# Patient Record
Sex: FEMALE | Race: WHITE | NOT HISPANIC OR LATINO | Employment: PART TIME | URBAN - METROPOLITAN AREA
[De-identification: names, ages, dates, MRNs, and addresses within clinical notes are randomized per-mention and may not be internally consistent; named-entity substitution may affect disease eponyms.]

---

## 2018-08-21 ENCOUNTER — TELEPHONE (OUTPATIENT)
Dept: OTHER | Facility: CLINIC | Age: 33
End: 2018-08-21

## 2018-08-21 NOTE — TELEPHONE ENCOUNTER
8/21/2018    Call Regarding Onboarding [employer] [facility]     Attempt 1    Message on voicemail    Comments: 0 dep      Outreach   Kitty Freitas

## 2018-09-21 NOTE — TELEPHONE ENCOUNTER
9/21/2018    Call Regarding Onboarding [employer] [facility]    Attempt 2    Message on voicemail     Comments: 0 DEP      Outreach   CC

## 2018-09-27 NOTE — TELEPHONE ENCOUNTER
9/27/2018    Call Regarding Onboarding: [facility]    Attempt 3    Message on voicemail     Comments:       Outreach   SV

## 2018-10-26 ENCOUNTER — OFFICE VISIT - HEALTHEAST (OUTPATIENT)
Dept: FAMILY MEDICINE | Facility: CLINIC | Age: 33
End: 2018-10-26

## 2018-10-26 ENCOUNTER — COMMUNICATION - HEALTHEAST (OUTPATIENT)
Dept: TELEHEALTH | Facility: CLINIC | Age: 33
End: 2018-10-26

## 2018-10-26 ENCOUNTER — COMMUNICATION - HEALTHEAST (OUTPATIENT)
Dept: FAMILY MEDICINE | Facility: CLINIC | Age: 33
End: 2018-10-26

## 2018-10-26 DIAGNOSIS — Z00.00 ROUTINE HEALTH MAINTENANCE: ICD-10-CM

## 2018-10-26 DIAGNOSIS — Z13.1 SCREENING FOR DIABETES MELLITUS: ICD-10-CM

## 2018-10-26 DIAGNOSIS — Z13.220 SCREENING FOR LIPID DISORDERS: ICD-10-CM

## 2018-10-26 LAB
ANION GAP SERPL CALCULATED.3IONS-SCNC: 10 MMOL/L (ref 5–18)
BUN SERPL-MCNC: 10 MG/DL (ref 8–22)
CALCIUM SERPL-MCNC: 9.3 MG/DL (ref 8.5–10.5)
CHLORIDE BLD-SCNC: 106 MMOL/L (ref 98–107)
CHOLEST SERPL-MCNC: 136 MG/DL
CO2 SERPL-SCNC: 24 MMOL/L (ref 22–31)
CREAT SERPL-MCNC: 0.74 MG/DL (ref 0.6–1.1)
FASTING STATUS PATIENT QL REPORTED: YES
GFR SERPL CREATININE-BSD FRML MDRD: >60 ML/MIN/1.73M2
GLUCOSE BLD-MCNC: 85 MG/DL (ref 70–125)
HDLC SERPL-MCNC: 61 MG/DL
LDLC SERPL CALC-MCNC: 58 MG/DL
POTASSIUM BLD-SCNC: 4.5 MMOL/L (ref 3.5–5)
SODIUM SERPL-SCNC: 140 MMOL/L (ref 136–145)
TRIGL SERPL-MCNC: 85 MG/DL

## 2018-10-26 RX ORDER — ETONOGESTREL/ETHINYL ESTRADIOL .12-.015MG
RING, VAGINAL VAGINAL
Refills: 2 | Status: ON HOLD | COMMUNITY
Start: 2018-10-13 | End: 2022-01-24

## 2018-10-26 RX ORDER — CETIRIZINE HYDROCHLORIDE 10 MG/1
TABLET ORAL
Status: ON HOLD | COMMUNITY
Start: 2018-10-26 | End: 2022-01-28

## 2018-10-26 ASSESSMENT — MIFFLIN-ST. JEOR: SCORE: 1490.93

## 2018-12-06 ENCOUNTER — OFFICE VISIT - HEALTHEAST (OUTPATIENT)
Dept: FAMILY MEDICINE | Facility: CLINIC | Age: 33
End: 2018-12-06

## 2018-12-06 DIAGNOSIS — J34.2 DEVIATED NASAL SEPTUM: ICD-10-CM

## 2018-12-06 DIAGNOSIS — Z01.818 ENCOUNTER FOR PREOPERATIVE EXAMINATION FOR GENERAL SURGICAL PROCEDURE: ICD-10-CM

## 2018-12-27 ENCOUNTER — SURGERY - HEALTHEAST (OUTPATIENT)
Dept: SURGERY | Facility: CLINIC | Age: 33
End: 2018-12-27

## 2018-12-28 ENCOUNTER — SURGERY - HEALTHEAST (OUTPATIENT)
Dept: SURGERY | Facility: CLINIC | Age: 33
End: 2018-12-28

## 2018-12-28 ENCOUNTER — ANESTHESIA - HEALTHEAST (OUTPATIENT)
Dept: SURGERY | Facility: CLINIC | Age: 33
End: 2018-12-28

## 2018-12-28 ASSESSMENT — MIFFLIN-ST. JEOR: SCORE: 1456.11

## 2021-06-02 VITALS — BODY MASS INDEX: 24.67 KG/M2 | WEIGHT: 162.8 LBS | HEIGHT: 68 IN

## 2021-06-02 VITALS — WEIGHT: 156 LBS | BODY MASS INDEX: 23.64 KG/M2 | HEIGHT: 68 IN

## 2021-06-02 VITALS — BODY MASS INDEX: 25.09 KG/M2 | WEIGHT: 166.2 LBS

## 2021-06-21 NOTE — PROGRESS NOTES
FEMALE PREVENTATIVE EXAM    Assessment and Plan:       1. Routine health maintenance  Healthy female exam    2. Screening for diabetes mellitus  - Basic Metabolic Panel    3. Screening for lipid disorders  - Lipid Hondo FASTING     Next follow up:  Return in about 1 year (around 10/26/2019).    Immunization Review  Adult Imm Review: No immunizations due today    I discussed the following with the patient:   Adult Healthy Living: Importance of regular exercise  Healthy nutrition  Stress management    I have had an Advance Directives discussion with the patient.    Subjective:   Chief Complaint: Suzette Rivera is an 33 y.o. female here for a preventative health visit.     HPI: Patient is .  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    Patient follows with dermatology, ENT, and gynecology.  She is scheduled to see her gynecologist next week for routine pelvic exam.  Patient is currently using a NuvaRing for contraception.  Periods are regular.    Patient recently seen at [facility] ENT due to chronic sinus congestion.  Patient was told she has a deviated septum, and is scheduled for a CT of the sinuses.  She also recently went through with a sleep study.  She does use a oral device at night.    History of exercise-induced asthma as a child.  This has mostly resolved.    Patient does not have any other complaints/concerns today.    Healthy Habits  Are you taking a daily aspirin? No  Do you typically exercising at least 40 min, 3-4 times per week?  NO  Do you usually eat at least 4 servings of fruit and vegetables a day, include whole grains and fiber and avoid regularly eating high fat foods? Yes  Have you had an eye exam in the past two years? Yes  Do you see a dentist twice per year? Yes  Do you have any concerns regarding sleep? No    Safety Screen  If you own firearms, are they secured in a locked gun cabinet or with trigger  "locks? The patient does not own any firearms  Do you feel you are safe where you are living?: Yes (10/26/2018  7:47 AM)  Do you feel you are safe in your relationship(s)?: Yes (10/26/2018  7:47 AM)    Review of Systems:  Please see above.  The rest of the review of systems are negative for all systems.     Pap History:   UTD.  Follow with Lamine YEUNG Scheduled next week  Cancer Screening       Status Date      PAP SMEAR Overdue 7/23/2016           Patient Care Team:  Tomeka Palm NP as PCP - General (Family Medicine)        History     Reviewed By Date/Time Sections Reviewed    Tomeka Palm NP 10/26/2018  7:54 AM Tobacco            Objective:   Vital Signs:   Visit Vitals     BP 98/60 (Patient Site: Right Arm, Patient Position: Sitting, Cuff Size: Adult Regular)     Pulse 88     Temp 98.1  F (36.7  C) (Oral)     Ht 5' 8.25\" (1.734 m)     Wt 162 lb 12.8 oz (73.8 kg)     Breastfeeding No     BMI 24.57 kg/m2          PHYSICAL EXAM  General Appearance: Alert, cooperative, no distress, appears stated age  Head: Normocephalic, without obvious abnormality, atraumatic  Eyes: PERRL, conjunctiva/corneas clear, EOM's intact  Ears: Normal TM's and external ear canals, both ears  Nose: Nares normal, septum deviated,mucosa normal, no drainage  Throat: Lips, mucosa, and tongue normal; teeth and gums normal  Neck: Supple, symmetrical, trachea midline, no adenopathy;  thyroid: not enlarged, symmetric, no tenderness/mass/nodules  Back: no CVA tenderness  Lungs: Clear to auscultation bilaterally, respirations unlabored  Breasts: No breast masses, tenderness, asymmetry, or nipple discharge.  Heart: Regular rate and rhythm, S1 and S2 normal, no murmur, rub, or gallop  Abdomen: Soft, non-tender, bowel sounds active all four quadrants,  no masses, no organomegaly  Extremities: Extremities normal, atraumatic, no cyanosis or edema  Skin: Skin color, texture, turgor normal, no rashes or lesions  Lymph nodes: Cervical, " supraclavicular, and axillary nodes normal  Neurologic: Normal   Psychologic: appropriate affective, answers all of my questions appropriately. No hallucinations, delusion, or suicidal ideations.    CHECO GormanC

## 2021-06-22 NOTE — PROGRESS NOTES
"Preoperative Exam    Scheduled Procedure: Deviated septum repair  Surgery Date:  12/28/18  Surgery Location: [facility] FAX:  [phone]  Surgeon:  Dr. Martinez    Assessment/Plan:     1. Encounter for preoperative examination for general surgical procedure  Cleared for surgery.  I do recommend a urine pregnancy test the day of surgery.    2. Deviated nasal septum    Surgical Procedure Risk: Low (reported cardiac risk generally < 1%)  Have you had prior anesthesia?: Yes  Have you or any family members had a previous anesthesia reaction:  Yes: mother is slow to coming out of it.  Do you or any family members have a history of a clotting or bleeding disorder?: No  Cardiac Risk Assessment: no increased risk for major cardiac complications    Patient approved for surgery with general or local anesthesia.    Please Note:  None    Functional Status: Independent  Patient plans to recover at home with family.     Subjective:      Suzette Rivera is a 33 y.o. female who presents for a preoperative consultation.  Patient is scheduled for a nasal septum repair due to deviation.  Reports difficulty with chronic congestion and post nasal drip.  She is a \"mouth-breather\" at night, as she has difficulty breathing from her nose.  She has tried oral antihistamines and nasal steroids without significant improvement.    Patient is having regular menstrual cycles.  Using the Nuva Ring for contraception.      All other systems reviewed and are negative, other than those listed in the HPI.    Pertinent History  Do you have difficulty breathing or chest pain after walking up a flight of stairs: No  History of obstructive sleep apnea: No  Steroid use in the last 6 months: No  Frequent Aspirin/NSAID use: No  Prior Blood Transfusion: No  Prior Blood Transfusion Reaction: N/A  If for some reason prior to, during or after the procedure, if it is medically indicated, would you be willing to have a blood transfusion?:  There is " no transfusion refusal.    Current Outpatient Medications   Medication Sig Dispense Refill     cetirizine (ZYRTEC) 10 MG tablet Take by mouth.       NUVARING 0.12-0.015 mg/24 hr vaginal ring   2     No current facility-administered medications for this visit.         Allergies   Allergen Reactions     Adhesive Tape-Silicones Other (See Comments)     Steri Strips     Augmentin [Amoxicillin-Pot Clavulanate] Rash       There is no problem list on file for this patient.      History reviewed. No pertinent past medical history.    Past Surgical History:   Procedure Laterality Date     HALLUX VALGUS CORRECTION Right      REFRACTIVE SURGERY         Social History     Socioeconomic History     Marital status:      Spouse name: Not on file     Number of children: Not on file     Years of education: Not on file     Highest education level: Not on file   Social Needs     Financial resource strain: Not on file     Food insecurity - worry: Not on file     Food insecurity - inability: Not on file     Transportation needs - medical: Not on file     Transportation needs - non-medical: Not on file   Occupational History     Not on file   Tobacco Use     Smoking status: Never Smoker     Smokeless tobacco: Never Used   Substance and Sexual Activity     Alcohol use: Yes     Drug use: No     Sexual activity: Yes     Partners: Male     Birth control/protection: Inserts   Other Topics Concern     Not on file   Social History Narrative     Not on file       Patient Care Team:  Tomeka Palm NP as PCP - General (Family Medicine)          Objective:     Vitals:    12/06/18 0859   BP: 98/70   Pulse: 100   Temp: 97.7  F (36.5  C)   TempSrc: Oral   Weight: 166 lb 3.2 oz (75.4 kg)   LMP: 11/14/2018         Physical Exam:  General Appearance: Alert, cooperative, no distress, appears stated age  Eyes: PERRL, conjunctiva/corneas clear, EOM's intact  Ears: Normal TM's and external ear canals, both ears  Nose: Nares normal, septum  deviated  Throat: Lips, mucosa, and tongue normal; teeth and gums normal  Neck: Supple, symmetrical, trachea midline, no adenopathy;  thyroid: not enlarged, symmetric, no tenderness/mass/nodules; no carotid bruit or JVD  Lungs: Clear to auscultation bilaterally, respirations unlabored  Heart: Regular rate and rhythm, S1 and S2 normal, no murmur, rub, or gallop  Abdomen: Soft, non-tender, bowel sounds active all four quadrants,  no masses, no organomegaly  Extremities: Extremities normal, atraumatic, no cyanosis or edema  Skin: Skin color, texture, turgor normal, no rashes or lesions  Lymph nodes: Cervical, supraclavicular nodes normal  Neurologic: Normal   Psychologic: appropriate affective, answers all of my questions appropriately. No hallucinations, delusion, or suicidal ideations.      Patient Instructions     Hold all supplements, aspirin and NSAIDs for 7 days prior to surgery.  Follow your surgeon's direction on when to stop eating and drinking prior to surgery.  Your surgeon will be managing your pain after your surgery.    Remove all jewelry and metal piercings before your surgery.   Remove nail polish from fingers before surgery.      Labs:  No labs were ordered during this visit    Immunization History   Administered Date(s) Administered     DTP 1986, 1986, 01/26/1987, 07/25/1988, 04/08/1991     Dtap 1986, 1986, 01/26/1987, 07/25/1988, 04/08/1991     Hep A, Adult IM (19yr & older) 10/18/2010, 02/05/2013     Hep B, Peds or Adolescent 08/05/1996, 09/23/1996, 06/05/1997     HiB, historic,unspecified 06/20/1988     Hib (PRP-T) 06/20/1988     Influenza S7m2-43, 11/13/2009     Influenza, Live, Nasal LAIV3 10/18/2010     Influenza,seasonal quad, PF, 36+MOS 11/19/2016     MMR 06/26/1987, 10/26/1987, 08/26/1998     Meningococcal MPSV4, SQ 07/14/2004     OPV,Trivalent,Historic(5924-3938 only) 1986, 1986, 07/25/1988, 04/08/1991     POLIO, Unspecified 1986, 1986,  07/25/1988, 04/08/1991     Td, Adult, Absorbed 08/26/1998     Tdap 11/06/2008     Typhoid, Inj, Inactive 10/18/2010     Varicella 06/16/2008           Electronically signed by Tomeka Palm NP 12/06/18 8:55 AM

## 2021-06-22 NOTE — ANESTHESIA CARE TRANSFER NOTE
Last vitals:   Vitals:    12/28/18 1558   BP: 141/84   Pulse: 80   Resp: 14   Temp: 36.3  C (97.4  F)   SpO2: 100%     Patient's level of consciousness is awake and drowsy  Spontaneous respirations: yes  Maintains airway independently: yes  Dentition unchanged: yes  Oropharynx: oropharynx clear of all foreign objects    QCDR Measures:  ASA# 20 - Surgical Safety Checklist: WHO surgical safety checklist completed prior to induction    PQRS# 430 - Adult PONV Prevention: 4558F - Pt received => 2 anti-emetic agents (different classes) preop & intraop  ASA# 8 - Peds PONV Prevention: NA - Not pediatric patient, not GA or 2 or more risk factors NOT present  PQRS# 424 - Chasity-op Temp Management: 4559F - At least one body temp DOCUMENTED => 35.5C or 95.9F within required timeframe  PQRS# 426 - PACU Transfer Protocol: - Transfer of care checklist used  ASA# 14 - Acute Post-op Pain: ASA14B - Patient did NOT experience pain >= 7 out of 10

## 2021-06-22 NOTE — ANESTHESIA PREPROCEDURE EVALUATION
Anesthesia Evaluation      Patient summary reviewed   No history of anesthetic complications     Airway   Mallampati: III  Neck ROM: full   Pulmonary - normal exam    breath sounds clear to auscultation                         Cardiovascular - normal exam  Exercise tolerance: > or = 4 METS  ECG reviewed  Rhythm: regular  Rate: normal,         Neuro/Psych - negative ROS     Endo/Other - negative ROS      GI/Hepatic/Renal - negative ROS           Dental    (+) poor dentition and chipped                       Anesthesia Plan  Planned anesthetic: general endotracheal  -- RSI with Succ  -- PONV prophylaxis with Decadron 10 mg and Zofran 4 mg  -- toradol if ok with surgeon    ASA 1   Induction: intravenous   Anesthetic plan and risks discussed with: patient  Anesthesia plan special considerations: antiemetics,   Post-op plan: routine recovery

## 2021-06-22 NOTE — ANESTHESIA POSTPROCEDURE EVALUATION
Patient: Suzette Rivera  SEPTOPLASTY, SUBMUCOSAL RESECTION OF TURBINATES, CAUTERY OF TURBINATES, NIMA BULLOSA RESECTION, PARTIAL ETHMOIDECTOMY, BILATERAL ANTROSTOMY WITH CURETTAGE, ADENOIDECTOMY  Anesthesia type: general    Patient location: PACU  Last vitals:   Vitals:    12/28/18 1645   BP: 131/68   Pulse: 78   Resp: 22   Temp:    SpO2: 98%     Post vital signs: stable  Level of consciousness: awake and responds to simple questions  Post-anesthesia pain: pain controlled  Post-anesthesia nausea and vomiting: no  Pulmonary: unassisted  Cardiovascular: stable  Hydration: adequate  Anesthetic events: no    QCDR Measures:  ASA# 11 - Chasity-op Cardiac Arrest: ASA11B - Patient did NOT experience unanticipated cardiac arrest  ASA# 12 - Chasity-op Mortality Rate: ASA12B - Patient did NOT die  ASA# 13 - PACU Re-Intubation Rate: ASA13B - Patient did NOT require a new airway mgmt  ASA# 10 - Composite Anes Safety: ASA10A - No serious adverse event    Additional Notes:

## 2021-07-22 LAB
ABO (EXTERNAL): NORMAL
HEMOGLOBIN (EXTERNAL): 11.3 G/DL (ref 11.7–15.5)
HEPATITIS B SURFACE ANTIGEN (EXTERNAL): NONREACTIVE
HIV1+2 AB SERPL QL IA: NONREACTIVE
PLATELET COUNT (EXTERNAL): 250 10E3/UL (ref 140–400)
RH (EXTERNAL): POSITIVE
RUBELLA ANTIBODY IGG (EXTERNAL): NORMAL
TREPONEMA PALLIDUM ANTIBODY (EXTERNAL): NONREACTIVE

## 2021-07-27 ENCOUNTER — MEDICAL CORRESPONDENCE (OUTPATIENT)
Dept: HEALTH INFORMATION MANAGEMENT | Facility: CLINIC | Age: 36
End: 2021-07-27

## 2021-07-27 ENCOUNTER — TRANSCRIBE ORDERS (OUTPATIENT)
Dept: MATERNAL FETAL MEDICINE | Facility: HOSPITAL | Age: 36
End: 2021-07-27

## 2021-07-27 ENCOUNTER — TRANSFERRED RECORDS (OUTPATIENT)
Dept: HEALTH INFORMATION MANAGEMENT | Facility: CLINIC | Age: 36
End: 2021-07-27

## 2021-07-27 DIAGNOSIS — O26.90 PREGNANCY RELATED CONDITION, ANTEPARTUM: Primary | ICD-10-CM

## 2021-09-13 ENCOUNTER — PRE VISIT (OUTPATIENT)
Dept: MATERNAL FETAL MEDICINE | Facility: HOSPITAL | Age: 36
End: 2021-09-13

## 2021-09-17 ENCOUNTER — OFFICE VISIT (OUTPATIENT)
Dept: MATERNAL FETAL MEDICINE | Facility: HOSPITAL | Age: 36
End: 2021-09-17
Attending: ADVANCED PRACTICE MIDWIFE
Payer: COMMERCIAL

## 2021-09-17 ENCOUNTER — ANCILLARY PROCEDURE (OUTPATIENT)
Dept: ULTRASOUND IMAGING | Facility: HOSPITAL | Age: 36
End: 2021-09-17
Attending: ADVANCED PRACTICE MIDWIFE
Payer: COMMERCIAL

## 2021-09-17 DIAGNOSIS — O09.812 PREGNANCY RESULTING FROM IN VITRO FERTILIZATION, SECOND TRIMESTER: ICD-10-CM

## 2021-09-17 DIAGNOSIS — O44.02 PLACENTA PREVIA ANTEPARTUM IN SECOND TRIMESTER: ICD-10-CM

## 2021-09-17 DIAGNOSIS — O09.512 AMA (ADVANCED MATERNAL AGE) PRIMIGRAVIDA 35+, SECOND TRIMESTER: Primary | ICD-10-CM

## 2021-09-17 DIAGNOSIS — Z36.9 PRENATAL SCREENING ENCOUNTER: ICD-10-CM

## 2021-09-17 DIAGNOSIS — O09.812 PREGNANCY RESULTING FROM IN VITRO FERTILIZATION IN SECOND TRIMESTER: ICD-10-CM

## 2021-09-17 DIAGNOSIS — O26.90 PREGNANCY RELATED CONDITION, ANTEPARTUM: ICD-10-CM

## 2021-09-17 PROCEDURE — 99207 PR NO CHARGE LOS: CPT | Performed by: OBSTETRICS & GYNECOLOGY

## 2021-09-17 PROCEDURE — 76811 OB US DETAILED SNGL FETUS: CPT

## 2021-09-17 PROCEDURE — 96040 HC GENETIC COUNSELING, EACH 30 MINUTES: CPT | Performed by: GENETIC COUNSELOR, MS

## 2021-09-17 NOTE — PROGRESS NOTES
[facility]  Genetic Counseling Consult    Patient:  Suzette Rivera YOB: 1985   Date of Service:  21      Suzette Rivera was seen at the [facility] for genetic consultation as part of her appointment for comprehensive ultrasound.  The indication for genetic counseling is advanced maternal age at the time of delivery and IVF pregnancy. Suzette was accompanied to today's consult by her , Mike.        Impression/Plan:   1. Suzette had a cell-free fetal DNA test earlier in pregnancy, which was normal. The couple are aware of the predicted fetal sex (female). Suzette and Mike have also undergone expanded carrier screening, they report today that they were not identified to be a carrier couple for any autosomal recessive conditions. Copies of their results were not available for review during today's consult.     2. Suzette had a comprehensive (level II) ultrasound today.  Please see the ultrasound report for further details.    3. The patient declines genetic amniocentesis and additional maternal serum screening (including MS-AFP) today.    Pregnancy History:   /Parity:     Age at Delivery: 36 year old  ANYI: 2022, by embryo transfer date of 21 Gestational Age: 18w4d    No significant complications or exposures were reported in the current pregnancy.    Suzette Rivera's current pregnancy was conceived via fresh day 5 embryo transfer on 21 using her egg and her 's sperm. Suzette Rivera was 35 years old when her egg was retrieved. The embryo did not undergo preimplantation genetic screening for aneuploidy prior to transfer. The couple have five remaining embryos that are frozen (day 6). No genetic testing has been completed on them as of today's consult. Suzette Rivera is aware of the recommendation for a fetal echocardiogram due to an IVF conception. We reviewed that the average pregnancy has a  0.5-1.0% chance of a congenital heart defect. However, studies suggest an increased chance for a heart defect for IVF pregnancies, with estimates ranging from 1-3.3%. Fetal echocardiograms are typically performed at 20 to 24 weeks gestation.     o Additionally, the current pregnancy was believed to be a monochorionic/ diamniotic twin gestation on week 6 ultrasound. However, when scanned at week 8, it was noted that twin 1 was not measuring much past week 6 while twin 2 continued to grow and was the appropriate size for dates. Therefore, the current eason pregnancy has resulted from a vanishing twin pregnancy.    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 Suzette reports he has elevated FSH levels and underwent chromosome analysis and Y-chromosome microdeletion analysis. Both genetic tests returned unremarkable, suggesting Mike does not have any notable chromosome anomalies or detectable copy number variants on his Y chromosome. Mike underwent sperm extraction for the purposes of IVF in 2021.    Medical History:   Suzette s reported medical history is not expected to impact pregnancy management or risks to fetal development.       Family History:   A three-generation pedigree was obtained, and is scanned under the  Media  tab.     The following significant findings were reported by Suzette:    Mother: kidney cancer diagnosed at age 26. She has had negative cancer testing through a 47 gene panel that is reported to have included both kidney cancer and breast cancer genes.    Maternal aunt: diagnosed in her 40s with breast cancer.     Maternal uncle: isolated bilateral club feet    Maternal grandmother: history of one possible stillbirth vs miscarriage, the cause of which is unknown to Suzette    Maternal grandmother's mother: history of breast cancer that then is believed to have recurred as ovarian cancer.     Maternal grandfather:  at age 53 from bladder cancer, he had  "a history of alcohol abuse.     Maternal grandfather's mother: believed to have a history of primary metastatic breast cancer.     Paternal uncle:  at age 11 due to unknown causes.     Paternal uncle: diagnosed with non-hodgkins lymphoma at age 18    Paternal grandfather:  in his 60s from a brain tumor, believed to be caused by radiation exposure while he was in the .     Mike: he is reported to have been diagnosed with epilepsy at age 22. While playing basketball, he began to experience aura and a sensation of terrence vu, later in his neurology work-up he experienced a grand mal seizure. He reports that every two weeks, he has a span of 2-3 days where, during multiple points in the day, he has \"small\" seizures. He experiences a grand mal seizure approximately once every 4-5 years. No other family history of seizures is known to the couple. He also had a history of skin cancer on lip lip, s/p surgical resection.   o We reviewed that Mike's seizure history increases the risk for any child of his to present similarly to him, however it is not a guarantee. Should a genetic etiology be identified, recurrence risks to the current pregnancy would likely be elevated over the general population risk. However, in the case of a sporadic/environmental, non-genetic cause of his seizures, recurrence risks are likely back at the general population risk. No prenatal testing for seizures or epilepsy in the current pregnancy is available. The couple verbalized understanding.    Mike's brother: personal history of severe melanoma that required intensive treatment. He is doing well overall today.     Mike's paternal uncle:  from lung cancer, believed to be related to exposures in the [place] war.     Mike's maternal uncle: possible history of male-factor infertility, believed to be related to a viral illness he had as a child. The details of this history are not well known to the family. He has no biological children " of his own.     Otherwise, the reported family history is negative for multiple miscarriages, stillbirths, birth defects, intellectual disabililties, known genetic conditions, and consanguinity.       Carrier Screening:   The patient reports that she and the father of the pregnancy have  ancestry:     Cystic fibrosis is an autosomal recessive genetic condition that occurs with increased frequency in individuals of  ancestry and carrier screening for this condition is available.  In addition,  screening in the [facility] includes cystic fibrosis.      Expanded carrier screening for mutations in a large panel of genes associated with autosomal recessive conditions including cystic fibrosis, spinal muscular atrophy, and others, is now available.      The couple have undergone expanded carrier screening as part of their IVF work-up and today report they are not a carrier couple for any given autosomal recessive condition. Copies of their carrier screening results were not available for review today.        Risk Assessment for Chromosome Conditions:     The risk for fetal chromosome abnormalities increases with maternal age. We discussed specific features of common chromosome abnormalities, including Down syndrome, trisomy 13, trisomy 18, and sex chromosome trisomies. Because the patient underwent egg retrieval at age 35, all embryos had the following risks to present wtih chromosome anomalies:     At age 35 at delivery, the risk to have a baby with Down syndrome is 1 in 500.   At age 35 at delivery, the risk to have a baby with any chromosome abnormality is 1 in 253.       Suzette had maternal serum screening earlier in pregnancy.     Non-invasive Prenatal Testing (NIPT)    Maternal plasma cell-free DNA testing    Screens for fetal trisomy 21, trisomy 13, trisomy 18, and sex chromosome aneuploidy    First trimester ultrasound with nuchal translucency and nasal bone assessment was within normal  limits.    Suzette had a Liz Panorama test earlier in pregnancy; we reviewed the results today, which are normal for chromosome 13, chromosome 18, chromosome 21, and the sex chromosomes (no aneuploidy detected)    Given the accuracy of this test, these results greatly decrease the chance for certain fetal chromosome abnormalities    We discussed the limitations of normal NIPT results    MSAFP (after 15 weeks for open neural tube defect screening) was offered to Suzette today, she declined proceeding with this neural tube defect screen       Testing Options:   We discussed the following options:     Non-invasive Prenatal Testing (NIPT)    Maternal plasma cell-free DNA testing; first trimester ultrasound with nuchal translucency and nasal bone assessment is recommended, when appropriate    Screens for fetal trisomy 21, trisomy 13, trisomy 18, and sex chromosome aneuploidy    Cannot screen for open neural tube defects; maternal serum AFP after 15 weeks is recommended       Genetic Amniocentesis    Invasive procedure typically performed in the second trimester by which amniotic fluid is obtained for the purpose of chromosome analysis and/or other prenatal genetic analysis    Diagnostic results; >99% sensitivity for fetal chromosome abnormalities    AFAFP measurement tests for open neural tube defects       Comprehensive (Level II) ultrasound: Detailed ultrasound performed between 18-22 weeks gestation to screen for major birth defects and markers for aneuploidy.    We reviewed the benefits and limitations of this testing.  Screening tests provide a risk assessment specific to the pregnancy for certain fetal chromosome abnormalities, but cannot definitively diagnose or exclude a fetal chromosome abnormality.  Follow-up genetic counseling and consideration of diagnostic testing is recommended with any abnormal screening result.     Diagnostic tests carry inherent risks- including risk of miscarriage- that require careful  consideration.  These tests can detect fetal chromosome abnormalities with greater than 99% certainty.  Results can be compromised by maternal cell contamination or mosaicism, and are limited by the resolution of cytogenetic G-banding technology.  There is no screening nor diagnostic test that can detect all forms of birth defects or mental disability.    It was a pleasure to be involved with Suzette s care. Face-to-face time of the meeting was 45 minutes.      Kiah Ballard MS, [facility]  Genetic Counselor  [facility]  Maternal Fetal Medicine  Ph: [phone] ([place])  Ph: [phone] ([facility])

## 2021-09-17 NOTE — PROGRESS NOTES
"Please see \"Imaging\" tab under \"Chart Review\" for details of today's visit.    Johnnie Currie    "

## 2021-10-15 ENCOUNTER — ANCILLARY PROCEDURE (OUTPATIENT)
Dept: ULTRASOUND IMAGING | Facility: HOSPITAL | Age: 36
End: 2021-10-15
Attending: OBSTETRICS & GYNECOLOGY
Payer: COMMERCIAL

## 2021-10-15 ENCOUNTER — OFFICE VISIT (OUTPATIENT)
Dept: MATERNAL FETAL MEDICINE | Facility: HOSPITAL | Age: 36
End: 2021-10-15
Attending: OBSTETRICS & GYNECOLOGY
Payer: COMMERCIAL

## 2021-10-15 DIAGNOSIS — O09.812 PREGNANCY RESULTING FROM IN VITRO FERTILIZATION IN SECOND TRIMESTER: ICD-10-CM

## 2021-10-15 DIAGNOSIS — O44.02 PLACENTA PREVIA IN SECOND TRIMESTER: Primary | ICD-10-CM

## 2021-10-15 DIAGNOSIS — O09.512 AMA (ADVANCED MATERNAL AGE) PRIMIGRAVIDA 35+, SECOND TRIMESTER: ICD-10-CM

## 2021-10-15 PROCEDURE — 76825 ECHO EXAM OF FETAL HEART: CPT | Mod: 26 | Performed by: OBSTETRICS & GYNECOLOGY

## 2021-10-15 PROCEDURE — 76816 OB US FOLLOW-UP PER FETUS: CPT

## 2021-10-15 PROCEDURE — 99207 PR NO CHARGE LOS: CPT | Performed by: OBSTETRICS & GYNECOLOGY

## 2021-10-15 PROCEDURE — 93325 DOPPLER ECHO COLOR FLOW MAPG: CPT | Mod: 26 | Performed by: OBSTETRICS & GYNECOLOGY

## 2021-10-15 PROCEDURE — 76816 OB US FOLLOW-UP PER FETUS: CPT | Mod: 26 | Performed by: OBSTETRICS & GYNECOLOGY

## 2021-10-15 PROCEDURE — 93325 DOPPLER ECHO COLOR FLOW MAPG: CPT

## 2021-10-15 PROCEDURE — 76827 ECHO EXAM OF FETAL HEART: CPT | Mod: 26 | Performed by: OBSTETRICS & GYNECOLOGY

## 2021-10-15 NOTE — PROGRESS NOTES
Please see the imaging tab for details of the ultrasound performed today.    Dixie Sanabria MD  Specialist in Maternal-Fetal Medicine

## 2022-01-20 ENCOUNTER — HOSPITAL ENCOUNTER (OUTPATIENT)
Facility: CLINIC | Age: 37
Discharge: HOME | End: 2022-01-20
Attending: ADVANCED PRACTICE MIDWIFE | Admitting: ADVANCED PRACTICE MIDWIFE
Payer: COMMERCIAL

## 2022-01-20 VITALS
TEMPERATURE: 97.8 F | HEIGHT: 68 IN | SYSTOLIC BLOOD PRESSURE: 142 MMHG | DIASTOLIC BLOOD PRESSURE: 94 MMHG | WEIGHT: 200.2 LBS | RESPIRATION RATE: 14 BRPM | BODY MASS INDEX: 30.34 KG/M2 | HEART RATE: 88 BPM

## 2022-01-20 PROBLEM — O16.3 ELEVATED BLOOD PRESSURE AFFECTING PREGNANCY IN THIRD TRIMESTER, ANTEPARTUM: Status: ACTIVE | Noted: 2022-01-20

## 2022-01-20 LAB
ALBUMIN MFR UR ELPH: <7 MG/DL
ALT SERPL W P-5'-P-CCNC: 16 U/L (ref 0–45)
AST SERPL W P-5'-P-CCNC: 16 U/L (ref 0–40)
CREAT SERPL-MCNC: 0.69 MG/DL (ref 0.6–1.1)
CREAT UR-MCNC: 11 MG/DL
ERYTHROCYTE [DISTWIDTH] IN BLOOD BY AUTOMATED COUNT: 13.2 % (ref 10–15)
GFR SERPL CREATININE-BSD FRML MDRD: >90 ML/MIN/1.73M2
GROUP B STREPTOCOCCUS (EXTERNAL): NEGATIVE
HCT VFR BLD AUTO: 33.6 % (ref 35–47)
HGB BLD-MCNC: 11.5 G/DL (ref 11.7–15.7)
MCH RBC QN AUTO: 31.1 PG (ref 26.5–33)
MCHC RBC AUTO-ENTMCNC: 34.2 G/DL (ref 31.5–36.5)
MCV RBC AUTO: 91 FL (ref 78–100)
PLATELET # BLD AUTO: 150 10E3/UL (ref 150–450)
PROT/CREAT 24H UR: NORMAL MG/G{CREAT}
RBC # BLD AUTO: 3.7 10E6/UL (ref 3.8–5.2)
WBC # BLD AUTO: 10.3 10E3/UL (ref 4–11)

## 2022-01-20 PROCEDURE — 84450 TRANSFERASE (AST) (SGOT): CPT | Performed by: ADVANCED PRACTICE MIDWIFE

## 2022-01-20 PROCEDURE — 84156 ASSAY OF PROTEIN URINE: CPT | Performed by: ADVANCED PRACTICE MIDWIFE

## 2022-01-20 PROCEDURE — 84460 ALANINE AMINO (ALT) (SGPT): CPT | Performed by: ADVANCED PRACTICE MIDWIFE

## 2022-01-20 PROCEDURE — 36415 COLL VENOUS BLD VENIPUNCTURE: CPT | Performed by: ADVANCED PRACTICE MIDWIFE

## 2022-01-20 PROCEDURE — 82565 ASSAY OF CREATININE: CPT | Performed by: ADVANCED PRACTICE MIDWIFE

## 2022-01-20 PROCEDURE — 85027 COMPLETE CBC AUTOMATED: CPT | Performed by: ADVANCED PRACTICE MIDWIFE

## 2022-01-20 RX ORDER — METOCLOPRAMIDE 10 MG/1
10 TABLET ORAL EVERY 6 HOURS PRN
Status: DISCONTINUED | OUTPATIENT
Start: 2022-01-20 | End: 2022-01-20 | Stop reason: HOSPADM

## 2022-01-20 RX ORDER — PROCHLORPERAZINE 25 MG
25 SUPPOSITORY, RECTAL RECTAL EVERY 12 HOURS PRN
Status: DISCONTINUED | OUTPATIENT
Start: 2022-01-20 | End: 2022-01-20 | Stop reason: HOSPADM

## 2022-01-20 RX ORDER — ONDANSETRON 4 MG/1
4 TABLET, ORALLY DISINTEGRATING ORAL EVERY 6 HOURS PRN
Status: DISCONTINUED | OUTPATIENT
Start: 2022-01-20 | End: 2022-01-20 | Stop reason: HOSPADM

## 2022-01-20 RX ORDER — METOCLOPRAMIDE HYDROCHLORIDE 5 MG/ML
10 INJECTION INTRAMUSCULAR; INTRAVENOUS EVERY 6 HOURS PRN
Status: DISCONTINUED | OUTPATIENT
Start: 2022-01-20 | End: 2022-01-20 | Stop reason: HOSPADM

## 2022-01-20 RX ORDER — FERROUS SULFATE 324(65)MG
TABLET, DELAYED RELEASE (ENTERIC COATED) ORAL EVERY OTHER DAY
Status: ON HOLD | COMMUNITY
End: 2024-01-24

## 2022-01-20 RX ORDER — ASPIRIN 81 MG/1
81 TABLET, CHEWABLE ORAL DAILY
Status: ON HOLD | COMMUNITY
End: 2022-01-28

## 2022-01-20 RX ORDER — ONDANSETRON 2 MG/ML
4 INJECTION INTRAMUSCULAR; INTRAVENOUS EVERY 6 HOURS PRN
Status: DISCONTINUED | OUTPATIENT
Start: 2022-01-20 | End: 2022-01-20 | Stop reason: HOSPADM

## 2022-01-20 RX ORDER — PROCHLORPERAZINE MALEATE 10 MG
10 TABLET ORAL EVERY 6 HOURS PRN
Status: DISCONTINUED | OUTPATIENT
Start: 2022-01-20 | End: 2022-01-20 | Stop reason: HOSPADM

## 2022-01-20 ASSESSMENT — MIFFLIN-ST. JEOR: SCORE: 1651.6

## 2022-01-20 NOTE — PROGRESS NOTES
Patient is here for gestational hypertension work up due to elevated blood pressure in the clinic.  EFM placed and frequent Bps started.  Labs obtained.  Will continue to monitor.    After about 1 hour patients Bps remain 130-1402/90s.  No symptoms.  All labs normal.  Huma Nelson called.  Order to discharge and follow up on Monday.  Also, given instructions on when to call the clinic.

## 2022-01-24 ENCOUNTER — HOSPITAL ENCOUNTER (INPATIENT)
Facility: CLINIC | Age: 37
LOS: 4 days | Discharge: HOME HEALTH | End: 2022-01-28
Attending: ADVANCED PRACTICE MIDWIFE | Admitting: ADVANCED PRACTICE MIDWIFE
Payer: COMMERCIAL

## 2022-01-24 PROBLEM — Z34.90 PREGNANCY: Status: ACTIVE | Noted: 2022-01-24

## 2022-01-24 PROBLEM — Z36.89 ENCOUNTER FOR TRIAGE IN PREGNANT PATIENT: Status: ACTIVE | Noted: 2022-01-24

## 2022-01-24 LAB
ABO/RH(D): NORMAL
ALBUMIN MFR UR ELPH: <7 MG/DL
ALT SERPL W P-5'-P-CCNC: 14 U/L (ref 0–45)
ANTIBODY SCREEN: NEGATIVE
AST SERPL W P-5'-P-CCNC: 16 U/L (ref 0–40)
CREAT SERPL-MCNC: 0.7 MG/DL (ref 0.6–1.1)
CREAT UR-MCNC: 20 MG/DL
ERYTHROCYTE [DISTWIDTH] IN BLOOD BY AUTOMATED COUNT: 13.2 % (ref 10–15)
GFR SERPL CREATININE-BSD FRML MDRD: >90 ML/MIN/1.73M2
HCT VFR BLD AUTO: 34.8 % (ref 35–47)
HGB BLD-MCNC: 11.8 G/DL (ref 11.7–15.7)
MCH RBC QN AUTO: 31.5 PG (ref 26.5–33)
MCHC RBC AUTO-ENTMCNC: 33.9 G/DL (ref 31.5–36.5)
MCV RBC AUTO: 93 FL (ref 78–100)
PLATELET # BLD AUTO: 143 10E3/UL (ref 150–450)
PROT/CREAT 24H UR: NORMAL MG/G{CREAT}
RBC # BLD AUTO: 3.75 10E6/UL (ref 3.8–5.2)
SARS-COV-2 RNA RESP QL NAA+PROBE: NEGATIVE
SPECIMEN EXPIRATION DATE: NORMAL
T PALLIDUM AB SER QL: NONREACTIVE
WBC # BLD AUTO: 10.6 10E3/UL (ref 4–11)

## 2022-01-24 PROCEDURE — 86901 BLOOD TYPING SEROLOGIC RH(D): CPT | Performed by: ADVANCED PRACTICE MIDWIFE

## 2022-01-24 PROCEDURE — 84450 TRANSFERASE (AST) (SGOT): CPT | Performed by: ADVANCED PRACTICE MIDWIFE

## 2022-01-24 PROCEDURE — 250N000011 HC RX IP 250 OP 636: Performed by: ADVANCED PRACTICE MIDWIFE

## 2022-01-24 PROCEDURE — 87635 SARS-COV-2 COVID-19 AMP PRB: CPT | Performed by: ADVANCED PRACTICE MIDWIFE

## 2022-01-24 PROCEDURE — 258N000003 HC RX IP 258 OP 636: Performed by: ADVANCED PRACTICE MIDWIFE

## 2022-01-24 PROCEDURE — 85027 COMPLETE CBC AUTOMATED: CPT | Performed by: ADVANCED PRACTICE MIDWIFE

## 2022-01-24 PROCEDURE — 84156 ASSAY OF PROTEIN URINE: CPT | Performed by: ADVANCED PRACTICE MIDWIFE

## 2022-01-24 PROCEDURE — 120N000001 HC R&B MED SURG/OB

## 2022-01-24 PROCEDURE — 84460 ALANINE AMINO (ALT) (SGPT): CPT | Performed by: ADVANCED PRACTICE MIDWIFE

## 2022-01-24 PROCEDURE — 82565 ASSAY OF CREATININE: CPT | Performed by: ADVANCED PRACTICE MIDWIFE

## 2022-01-24 PROCEDURE — 250N000013 HC RX MED GY IP 250 OP 250 PS 637: Performed by: STUDENT IN AN ORGANIZED HEALTH CARE EDUCATION/TRAINING PROGRAM

## 2022-01-24 PROCEDURE — 36415 COLL VENOUS BLD VENIPUNCTURE: CPT | Performed by: ADVANCED PRACTICE MIDWIFE

## 2022-01-24 PROCEDURE — 86780 TREPONEMA PALLIDUM: CPT | Performed by: ADVANCED PRACTICE MIDWIFE

## 2022-01-24 RX ORDER — MAGNESIUM SULFATE 4 G/50ML
4 INJECTION INTRAVENOUS ONCE
Status: COMPLETED | OUTPATIENT
Start: 2022-01-24 | End: 2022-01-24

## 2022-01-24 RX ORDER — OXYTOCIN/0.9 % SODIUM CHLORIDE 30/500 ML
100-340 PLASTIC BAG, INJECTION (ML) INTRAVENOUS CONTINUOUS PRN
Status: DISCONTINUED | OUTPATIENT
Start: 2022-01-24 | End: 2022-01-29 | Stop reason: HOSPADM

## 2022-01-24 RX ORDER — NALOXONE HYDROCHLORIDE 0.4 MG/ML
0.2 INJECTION, SOLUTION INTRAMUSCULAR; INTRAVENOUS; SUBCUTANEOUS
Status: DISCONTINUED | OUTPATIENT
Start: 2022-01-24 | End: 2022-01-26 | Stop reason: HOSPADM

## 2022-01-24 RX ORDER — METOCLOPRAMIDE 10 MG/1
10 TABLET ORAL EVERY 6 HOURS PRN
Status: DISCONTINUED | OUTPATIENT
Start: 2022-01-24 | End: 2022-01-26 | Stop reason: HOSPADM

## 2022-01-24 RX ORDER — MISOPROSTOL 100 UG/1
25 TABLET ORAL
Status: COMPLETED | OUTPATIENT
Start: 2022-01-24 | End: 2022-01-25

## 2022-01-24 RX ORDER — METHYLERGONOVINE MALEATE 0.2 MG/ML
200 INJECTION INTRAVENOUS
Status: DISCONTINUED | OUTPATIENT
Start: 2022-01-24 | End: 2022-01-26 | Stop reason: HOSPADM

## 2022-01-24 RX ORDER — HYDROXYZINE HYDROCHLORIDE 25 MG/1
50-100 TABLET, FILM COATED ORAL
Status: DISCONTINUED | OUTPATIENT
Start: 2022-01-24 | End: 2022-01-26 | Stop reason: HOSPADM

## 2022-01-24 RX ORDER — KETOROLAC TROMETHAMINE 30 MG/ML
30 INJECTION, SOLUTION INTRAMUSCULAR; INTRAVENOUS
Status: DISCONTINUED | OUTPATIENT
Start: 2022-01-24 | End: 2022-01-29 | Stop reason: HOSPADM

## 2022-01-24 RX ORDER — NALOXONE HYDROCHLORIDE 0.4 MG/ML
0.4 INJECTION, SOLUTION INTRAMUSCULAR; INTRAVENOUS; SUBCUTANEOUS
Status: DISCONTINUED | OUTPATIENT
Start: 2022-01-24 | End: 2022-01-26 | Stop reason: HOSPADM

## 2022-01-24 RX ORDER — ONDANSETRON 2 MG/ML
4 INJECTION INTRAMUSCULAR; INTRAVENOUS EVERY 6 HOURS PRN
Status: DISCONTINUED | OUTPATIENT
Start: 2022-01-24 | End: 2022-01-26 | Stop reason: HOSPADM

## 2022-01-24 RX ORDER — MAGNESIUM SULFATE IN WATER 40 MG/ML
2 INJECTION, SOLUTION INTRAVENOUS CONTINUOUS
Status: DISCONTINUED | OUTPATIENT
Start: 2022-01-24 | End: 2022-01-29 | Stop reason: HOSPADM

## 2022-01-24 RX ORDER — IBUPROFEN 600 MG/1
600 TABLET, FILM COATED ORAL
Status: DISCONTINUED | OUTPATIENT
Start: 2022-01-24 | End: 2022-01-29 | Stop reason: HOSPADM

## 2022-01-24 RX ORDER — METOCLOPRAMIDE HYDROCHLORIDE 5 MG/ML
10 INJECTION INTRAMUSCULAR; INTRAVENOUS EVERY 6 HOURS PRN
Status: DISCONTINUED | OUTPATIENT
Start: 2022-01-24 | End: 2022-01-26 | Stop reason: HOSPADM

## 2022-01-24 RX ORDER — LABETALOL HYDROCHLORIDE 5 MG/ML
20-80 INJECTION, SOLUTION INTRAVENOUS EVERY 10 MIN PRN
Status: DISCONTINUED | OUTPATIENT
Start: 2022-01-24 | End: 2022-01-29 | Stop reason: HOSPADM

## 2022-01-24 RX ORDER — CALCIUM GLUCONATE 94 MG/ML
1 INJECTION, SOLUTION INTRAVENOUS
Status: DISCONTINUED | OUTPATIENT
Start: 2022-01-24 | End: 2022-01-29 | Stop reason: HOSPADM

## 2022-01-24 RX ORDER — HYDROXYZINE HYDROCHLORIDE 25 MG/1
100 TABLET, FILM COATED ORAL EVERY 6 HOURS PRN
Status: DISCONTINUED | OUTPATIENT
Start: 2022-01-24 | End: 2022-01-29 | Stop reason: HOSPADM

## 2022-01-24 RX ORDER — MAGNESIUM SULFATE HEPTAHYDRATE 500 MG/ML
10 INJECTION, SOLUTION INTRAMUSCULAR; INTRAVENOUS
Status: DISCONTINUED | OUTPATIENT
Start: 2022-01-24 | End: 2022-01-29 | Stop reason: HOSPADM

## 2022-01-24 RX ORDER — HYDRALAZINE HYDROCHLORIDE 20 MG/ML
10 INJECTION INTRAMUSCULAR; INTRAVENOUS
Status: DISCONTINUED | OUTPATIENT
Start: 2022-01-24 | End: 2022-01-29 | Stop reason: HOSPADM

## 2022-01-24 RX ORDER — MAGNESIUM SULFATE 4 G/50ML
4 INJECTION INTRAVENOUS
Status: DISCONTINUED | OUTPATIENT
Start: 2022-01-24 | End: 2022-01-29 | Stop reason: HOSPADM

## 2022-01-24 RX ORDER — MISOPROSTOL 200 UG/1
400 TABLET ORAL
Status: DISCONTINUED | OUTPATIENT
Start: 2022-01-24 | End: 2022-01-26 | Stop reason: HOSPADM

## 2022-01-24 RX ORDER — SODIUM CHLORIDE, SODIUM LACTATE, POTASSIUM CHLORIDE, CALCIUM CHLORIDE 600; 310; 30; 20 MG/100ML; MG/100ML; MG/100ML; MG/100ML
10-125 INJECTION, SOLUTION INTRAVENOUS CONTINUOUS
Status: DISCONTINUED | OUTPATIENT
Start: 2022-01-24 | End: 2022-01-29 | Stop reason: HOSPADM

## 2022-01-24 RX ORDER — ONDANSETRON 4 MG/1
4 TABLET, ORALLY DISINTEGRATING ORAL EVERY 6 HOURS PRN
Status: DISCONTINUED | OUTPATIENT
Start: 2022-01-24 | End: 2022-01-26 | Stop reason: HOSPADM

## 2022-01-24 RX ORDER — OXYTOCIN 10 [USP'U]/ML
10 INJECTION, SOLUTION INTRAMUSCULAR; INTRAVENOUS
Status: DISCONTINUED | OUTPATIENT
Start: 2022-01-24 | End: 2022-01-26 | Stop reason: HOSPADM

## 2022-01-24 RX ORDER — MAGNESIUM SULFATE HEPTAHYDRATE 40 MG/ML
2 INJECTION, SOLUTION INTRAVENOUS
Status: DISCONTINUED | OUTPATIENT
Start: 2022-01-24 | End: 2022-01-29 | Stop reason: HOSPADM

## 2022-01-24 RX ORDER — LIDOCAINE 40 MG/G
CREAM TOPICAL
Status: DISCONTINUED | OUTPATIENT
Start: 2022-01-24 | End: 2022-01-24 | Stop reason: HOSPADM

## 2022-01-24 RX ORDER — MORPHINE SULFATE 10 MG/ML
10 INJECTION, SOLUTION INTRAMUSCULAR; INTRAVENOUS
Status: DISCONTINUED | OUTPATIENT
Start: 2022-01-24 | End: 2022-01-26

## 2022-01-24 RX ORDER — PROCHLORPERAZINE MALEATE 10 MG
10 TABLET ORAL EVERY 6 HOURS PRN
Status: DISCONTINUED | OUTPATIENT
Start: 2022-01-24 | End: 2022-01-26 | Stop reason: HOSPADM

## 2022-01-24 RX ORDER — MISOPROSTOL 200 UG/1
800 TABLET ORAL
Status: DISCONTINUED | OUTPATIENT
Start: 2022-01-24 | End: 2022-01-26 | Stop reason: HOSPADM

## 2022-01-24 RX ORDER — LORAZEPAM 2 MG/ML
2 INJECTION INTRAMUSCULAR
Status: DISCONTINUED | OUTPATIENT
Start: 2022-01-24 | End: 2022-01-29 | Stop reason: HOSPADM

## 2022-01-24 RX ORDER — PROCHLORPERAZINE 25 MG
25 SUPPOSITORY, RECTAL RECTAL EVERY 12 HOURS PRN
Status: DISCONTINUED | OUTPATIENT
Start: 2022-01-24 | End: 2022-01-26 | Stop reason: HOSPADM

## 2022-01-24 RX ORDER — MORPHINE SULFATE 2 MG/ML
2 INJECTION, SOLUTION INTRAMUSCULAR; INTRAVENOUS ONCE
Status: DISCONTINUED | OUTPATIENT
Start: 2022-01-24 | End: 2022-01-24

## 2022-01-24 RX ORDER — OXYTOCIN/0.9 % SODIUM CHLORIDE 30/500 ML
340 PLASTIC BAG, INJECTION (ML) INTRAVENOUS CONTINUOUS PRN
Status: DISCONTINUED | OUTPATIENT
Start: 2022-01-24 | End: 2022-01-26 | Stop reason: HOSPADM

## 2022-01-24 RX ORDER — LIDOCAINE 40 MG/G
CREAM TOPICAL
Status: DISCONTINUED | OUTPATIENT
Start: 2022-01-24 | End: 2022-01-29 | Stop reason: HOSPADM

## 2022-01-24 RX ORDER — CARBOPROST TROMETHAMINE 250 UG/ML
250 INJECTION, SOLUTION INTRAMUSCULAR
Status: DISCONTINUED | OUTPATIENT
Start: 2022-01-24 | End: 2022-01-26 | Stop reason: HOSPADM

## 2022-01-24 RX ORDER — OXYTOCIN 10 [USP'U]/ML
10 INJECTION, SOLUTION INTRAMUSCULAR; INTRAVENOUS
Status: DISCONTINUED | OUTPATIENT
Start: 2022-01-24 | End: 2022-01-29 | Stop reason: HOSPADM

## 2022-01-24 RX ADMIN — MAGNESIUM SULFATE IN WATER 2 G/HR: 40 INJECTION, SOLUTION INTRAVENOUS at 13:33

## 2022-01-24 RX ADMIN — MISOPROSTOL 25 MCG: 100 TABLET ORAL at 14:30

## 2022-01-24 RX ADMIN — MAGNESIUM SULFATE HEPTAHYDRATE 4 G: 80 INJECTION, SOLUTION INTRAVENOUS at 13:06

## 2022-01-24 RX ADMIN — SODIUM CHLORIDE, POTASSIUM CHLORIDE, SODIUM LACTATE AND CALCIUM CHLORIDE 75 ML/HR: 600; 310; 30; 20 INJECTION, SOLUTION INTRAVENOUS at 12:56

## 2022-01-24 RX ADMIN — MISOPROSTOL 25 MCG: 100 TABLET ORAL at 20:52

## 2022-01-24 RX ADMIN — MISOPROSTOL 25 MCG: 100 TABLET ORAL at 18:40

## 2022-01-24 RX ADMIN — MISOPROSTOL 25 MCG: 100 TABLET ORAL at 22:57

## 2022-01-24 RX ADMIN — MISOPROSTOL 25 MCG: 100 TABLET ORAL at 16:30

## 2022-01-24 ASSESSMENT — MIFFLIN-ST. JEOR: SCORE: 1650.69

## 2022-01-24 NOTE — PROVIDER NOTIFICATION
Spoke with Mindy Catalan CNM on call. Relayed continued elevated blood pressures to her. She states she is in the building and will be right up to see the patient.

## 2022-01-24 NOTE — PLAN OF CARE
Pt arrives to [facility] from clinic for concerns of elevated blood pressure. She is ambulatory and accompanied by her .

## 2022-01-24 NOTE — H&P
"HISTORY AND PHYSICAL UPDATE ADMISSION EXAM    Name: Suzette Rivera  YOB: 1985  Medical Record Number: [id number]    History of Present Illness: Suzette Rivera is a 36 year old  who is 37w0d pregnant and being admitted for induction of labor, indication severe pre-eclampsia based on severe range blood pressures.  Pt denies HA, visual changes, epigastric pain, n/v.    Estimated Date of Delivery: 2022    EGA 37w0d    OB History    Para Term  AB Living   1 0 0 0 0 0   SAB IAB Ectopic Multiple Live Births   0 0 0 0 0      # Outcome Date GA Lbr Nicholas/2nd Weight Sex Delivery Anes PTL Lv   1 Current                 Lab Results   Component Value Date    AS Negative 2022    HGB 11.8 2022       Prenatal Complications: AMA, IVF pregnancy, hypertension that has progressed to severe range preeclampsia    Exam:      BP (!) 163/108   Temp 98  F (36.7  C) (Oral)   Resp 20   Ht 1.727 m (5' 8\")   Wt 90.7 kg (200 lb)   BMI 30.41 kg/m      Fetal heart Rate Category 1  Contractions None    HEENT grossly normal  Neck: no lymphadenopathy or thryoidomegaly  Lungs CTA  Heart S1S2  ABD gravid, non-tender  EXT:  no edema, moves freely  +2 reflexes bilaterally no clonus  Vaginal exam closed/thick  Membranes: intact    Assessment: induction of labor, indication severe preeclampsia    Plan:  Admit to inpatient  HELLP panel and PC ratio pending  Hypertensive order set   Magnesiusm 4gm load followed by 2gm/hour  Cytotec per order set for cervical ripening  MD is agreeable with plan and will now assume care      Prenatal record reviewed.    MICHAEL Sifuentes CNM      2022   12:50 PM  "

## 2022-01-24 NOTE — PROVIDER NOTIFICATION
Spoke with Mindy Catalan CNM on call for the day. Relayed pt's arrival to [facility] [place] [place] for triage from clinic related to elevated blood pressures. Discussed with her the first two blood pressures taken and physical assessment as well as EFM tracing. T.O. to place IV access, do blood pressures every 15-30 minutes, and place order for a HELLP panel.

## 2022-01-24 NOTE — PROVIDER NOTIFICATION
01/24/22 1632 01/24/22 1643   Vitals   BP (!) 164/108 (!) 157/96   Ob resident updated on blood pressures.  Dr. Huddleston will consult with Dr. Caraballo.    Patient continues to deny headache, blurred vision and epigastric pain.

## 2022-01-24 NOTE — PROGRESS NOTES
"Brief progress note    SUBJECTIVE:  Patient continues to feel well.  Has no complaints.    OBJECTIVE:  BP (!) 157/96   Temp 98  F (36.7  C) (Oral)   Resp 16   Ht 1.727 m (5' 8\")   Wt 90.7 kg (200 lb)   SpO2 96%   Breastfeeding Yes   BMI 30.41 kg/m       Systolic blood pressure has occasionally hovered in low 160s, but clinically resolves back to high 150s.    ASSESSMENT/PLAN:  Suzette Rivera is a 36 year old  at 37w0d admitted for induction of labor after developing preeclampsia with elevated blood pressures.  Blood pressures have remained below severe range.  #Preeclampsia  #Elevated blood pressures in pregnancy  #In vitro pregnancy    Continue present management    Given elevated pulse, would use labetalol if pressures increase to severe range (SBP > 160)    Continue Cytotec cervical ripening    Discussed patient with attending Osman Caraballo MD who agrees with plan of care.  My colleague resident Dr. Katie Quintana will assume care for the patient and will continue to update attending along with RN.    FYI: Patient GBS negative on 2022, will note in chart  ----  Jackie Finney MD  PGY-3  Family Medicine Resident      "

## 2022-01-25 PROBLEM — O09.813 PREGNANCY RESULTING FROM IN VITRO FERTILIZATION IN THIRD TRIMESTER: Status: ACTIVE | Noted: 2022-01-24

## 2022-01-25 PROBLEM — Z34.90 ENCOUNTER FOR ELECTIVE INDUCTION OF LABOR: Status: ACTIVE | Noted: 2022-01-25

## 2022-01-25 PROBLEM — O14.13 SEVERE PRE-ECLAMPSIA IN THIRD TRIMESTER: Status: ACTIVE | Noted: 2022-01-24

## 2022-01-25 LAB — MAGNESIUM SERPL-MCNC: 5.2 MG/DL (ref 1.8–2.6)

## 2022-01-25 PROCEDURE — 250N000011 HC RX IP 250 OP 636: Performed by: ADVANCED PRACTICE MIDWIFE

## 2022-01-25 PROCEDURE — 3E0P7VZ INTRODUCTION OF HORMONE INTO FEMALE REPRODUCTIVE, VIA NATURAL OR ARTIFICIAL OPENING: ICD-10-PCS | Performed by: OBSTETRICS & GYNECOLOGY

## 2022-01-25 PROCEDURE — 250N000013 HC RX MED GY IP 250 OP 250 PS 637: Performed by: STUDENT IN AN ORGANIZED HEALTH CARE EDUCATION/TRAINING PROGRAM

## 2022-01-25 PROCEDURE — 36415 COLL VENOUS BLD VENIPUNCTURE: CPT | Performed by: OBSTETRICS & GYNECOLOGY

## 2022-01-25 PROCEDURE — 120N000001 HC R&B MED SURG/OB

## 2022-01-25 PROCEDURE — 250N000013 HC RX MED GY IP 250 OP 250 PS 637

## 2022-01-25 PROCEDURE — 258N000003 HC RX IP 258 OP 636: Performed by: ADVANCED PRACTICE MIDWIFE

## 2022-01-25 PROCEDURE — 83735 ASSAY OF MAGNESIUM: CPT | Performed by: OBSTETRICS & GYNECOLOGY

## 2022-01-25 RX ORDER — MORPHINE SULFATE 10 MG/ML
10 INJECTION, SOLUTION INTRAMUSCULAR; INTRAVENOUS
Status: DISCONTINUED | OUTPATIENT
Start: 2022-01-25 | End: 2022-01-29 | Stop reason: HOSPADM

## 2022-01-25 RX ORDER — ACETAMINOPHEN 325 MG/1
650 TABLET ORAL EVERY 4 HOURS PRN
Status: DISCONTINUED | OUTPATIENT
Start: 2022-01-25 | End: 2022-01-29 | Stop reason: HOSPADM

## 2022-01-25 RX ADMIN — MISOPROSTOL 25 MCG: 100 TABLET ORAL at 09:16

## 2022-01-25 RX ADMIN — MISOPROSTOL 25 MCG: 100 TABLET ORAL at 06:57

## 2022-01-25 RX ADMIN — MISOPROSTOL 25 MCG: 100 TABLET ORAL at 00:57

## 2022-01-25 RX ADMIN — DINOPROSTONE 10 MG: 10 INSERT VAGINAL at 17:23

## 2022-01-25 RX ADMIN — HYDROXYZINE HYDROCHLORIDE 100 MG: 25 TABLET, FILM COATED ORAL at 22:25

## 2022-01-25 RX ADMIN — SODIUM CHLORIDE, POTASSIUM CHLORIDE, SODIUM LACTATE AND CALCIUM CHLORIDE 75 ML/HR: 600; 310; 30; 20 INJECTION, SOLUTION INTRAVENOUS at 00:58

## 2022-01-25 RX ADMIN — MISOPROSTOL 25 MCG: 100 TABLET ORAL at 11:57

## 2022-01-25 RX ADMIN — MISOPROSTOL 25 MCG: 100 TABLET ORAL at 14:33

## 2022-01-25 RX ADMIN — MISOPROSTOL 25 MCG: 100 TABLET ORAL at 04:55

## 2022-01-25 RX ADMIN — MISOPROSTOL 25 MCG: 100 TABLET ORAL at 02:57

## 2022-01-25 RX ADMIN — MAGNESIUM SULFATE IN WATER 2 G/HR: 40 INJECTION, SOLUTION INTRAVENOUS at 10:10

## 2022-01-25 RX ADMIN — ACETAMINOPHEN 650 MG: 325 TABLET ORAL at 04:55

## 2022-01-25 RX ADMIN — SODIUM CHLORIDE, POTASSIUM CHLORIDE, SODIUM LACTATE AND CALCIUM CHLORIDE 75 ML/HR: 600; 310; 30; 20 INJECTION, SOLUTION INTRAVENOUS at 14:33

## 2022-01-25 RX ADMIN — ONDANSETRON 4 MG: 4 TABLET, ORALLY DISINTEGRATING ORAL at 23:38

## 2022-01-25 NOTE — PROGRESS NOTES
"L&D Progress Note    S: patient comfortable, not perceiving contractions. Has a mild frontal headache but declines meds. Otherwise no pec symptoms    O  BP (!) 147/93   Temp 97.3  F (36.3  C)   Resp 16   Ht 1.727 m (5' 8\")   Wt 90.7 kg (200 lb)   SpO2 98%   Breastfeeding Yes   BMI 30.41 kg/m    Ext: 1+ reflexes  SVE: 0/30/-3 at 1230pm 1/24  [place]: contractions q5-8 min   FHT: baseline 130, mod herman, +accels, no decels  UOP: >50cc/h    Creat 0.70  ALT/AST 14/16  Plt 143  U P:C neg     A/P: Patient is a 37yo P0 at 37w0d a/f IOL for SPEC.   1. SPEC: patient met criteria on presentation with persistent severe range Bps. Bps since have been persistently mild, occasional severe but not persistent enough to warrant IV antihypertensive. On Mg for seizure prophylaxis, UOP adequate, no s/sx Mg toxicity. Labs negative on admission, check as clinically indicated.    2. IOL: receiving cytotec protocol for cervical ripening, started at 1430 on 1/24. Recheck SVE after 24h ripening or sooner if clinically indicated.   3. Fetus: periods of minimal variability likely 2/2 Mg, but overall reassuring with accels and periods of moderate variability. Cephalic by sono 1/20. EFW 3145g on 1/20. Cont EFM.   4. GBS neg    Osman Caraballo MD    "

## 2022-01-25 NOTE — PROGRESS NOTES
[facility] Progress Note    Suzette Rivera MRN# [id number]   Age: 36 year old YOB: 1985           Subjective:   Patient is doing well.  She is tolerating Cytotec.  She recently took a shower and is feeling much better overall.  She is feeling her contractions but it is very mild.           Objective:   Patient Vitals for the past 24 hrs:   BP Temp Temp src Resp SpO2 Oximeter Heart Rate   22 1334 (!) 141/95 -- -- -- -- 75 bpm   22 0925 (!) 137/91 98.4  F (36.9  C) Oral 16 -- 82 bpm   22 0448 135/85 97.9  F (36.6  C) Oral -- 95 % 106 bpm   22 0100 117/71 -- -- 16 96 % 91 bpm   22 2309 (!) 135/94 -- -- -- 94 % 90 bpm   22 2302 -- -- -- -- 95 % 89 bpm   227 -- -- -- -- 98 % 92 bpm   226 (!) 147/93 97.3  F (36.3  C) -- 16 -- 86 bpm   22 1844 (!) 141/95 -- -- -- -- 85 bpm   22 1643 (!) 157/96 -- -- -- -- 97 bpm   22 1632 (!) 164/108 -- -- -- -- 107 bpm         Cervical Exam: External cervix 1cm, closed proximally  0cm / 30% / -3      Position: Mid  Exam by HECTOR Castro    Membranes: Intact     Fetal Heart Rate:    Monitor: external US    Variability: moderate (amplitude range 6 to 25 bpm)    Baseline Rate: normal range    Fetal Heart Rate Tracing: Category 1          Assessment:   Suzette Rivera is a 36 year old  who is 37w1d here with preeclampsia here for induction of labor.  Blood pressures have remained below severe range.  Patient has completed 12 doses of Cytotec and has made some cervical change.  However, Monroe score is still unfavorable so we will proceed with cervical ripening with Cervidil.    Principal Problem:    Severe pre-eclampsia in third trimester  Active Problems:    Pregnancy resulting from in vitro fertilization in third trimester    Encounter for induction of labor          Plan:   - Continue magnesium drip  - Insert Cervidil, plan to remove in 12 hours unless tachysystole,  intolerance, or cervical dilation achieved  - Updated attending Dr. Reji Schofield  - Resident will continue to comanage with RN and update attending physician    ----  Jackie Finney MD  PGY-3  Family Medicine Resident

## 2022-01-25 NOTE — PLAN OF CARE
Problem: Hypertensive Disorders in Pregnancy  Goal: Maternal-Fetal Stabilization  Outcome: Improving     Problem: Change in Fetal Wellbeing (Labor)  Goal: Stable Fetal Wellbeing  Outcome: Improving     Problem: Delayed Labor Progression (Labor)  Goal: Effective Progression to Delivery  Outcome: Improving    BP stable at one teens to 130s/70's-90s.  Pt c/o headache that she rates as mild and took tylenol shortly prior to 0500.  Clonus is absent and reflexes are primarily +1.  Declined sleeps meds overnight, and stated she was able to rest.  Variability is minimal to moderate with accels present.  Fatoumata every 2-8 minutes and describes them as a mild period cramp.  Has currently had 8 doses of cytotec.  Continue with POC.

## 2022-01-25 NOTE — L&D DELIVERY NOTE
"Progress Note     Suzette Rivera 37 yo , IUP 37 w 1 d  Hypertension  Admitted for induction of labor  On magnesium sulfate for prevention of seizures    Subjective:  Asymptomatic except mild headache.  No blurred vision or epigastralgia    Objective:  BP (!) 137/91   Temp 98.4  F (36.9  C) (Oral)   Resp 16   Ht 1.727 m (5' 8\")   Wt 90.7 kg (200 lb)   SpO2 95%   Breastfeeding Yes   BMI 30.41 kg/m    Temp:  [97.3  F (36.3  C)-98.4  F (36.9  C)] 98.4  F (36.9  C)  Resp:  [16-20] 16  BP: (117-167)/() 137/91  SpO2:  [94 %-98 %] 95 %  General: no apparent distress   Abdomen:  Soft. Tender to palpation, no rebound or peritoneal signs    Incision clean, dry and intact.  Ext: No edema or pain.  FHT moderate variability, accelerations present, no decelerations, Baseline 140 bpm. Category I.  Uterine contractions every 3-4 minutes.  SVE reported from yesterday, cervix closed and long, firm.    I/Os    Intake/Output Summary (Last 24 hours) at 2022 1037  Last data filed at 2022 0915  Gross per 24 hour   Intake 5408.92 ml   Output 4050 ml   Net 1358.92 ml       Labs:  Lab Results   Component Value Date    HGB 11.8 2022     Recent Results (from the past 240 hour(s))   Group B Streptococcus (External Result)    Collection Time: 22 12:00 PM   Result Value Ref Range    Group B Streptococcus (External) Negative Negative   Protein  random urine    Collection Time: 22  5:37 PM   Result Value Ref Range    Total Protein Urine mg/dL <7.0 mg/dL    Total Protein UR MG/MG CR      Creatinine Urine mg/dL 11 mg/dL   CBC with platelets    Collection Time: 22  5:44 PM   Result Value Ref Range    WBC Count 10.3 4.0 - 11.0 10e3/uL    RBC Count 3.70 (L) 3.80 - 5.20 10e6/uL    Hemoglobin 11.5 (L) 11.7 - 15.7 g/dL    Hematocrit 33.6 (L) 35.0 - 47.0 %    MCV 91 78 - 100 fL    MCH 31.1 26.5 - 33.0 pg    MCHC 34.2 31.5 - 36.5 g/dL    RDW 13.2 10.0 - 15.0 %    Platelet Count 150 150 - 450 10e3/uL   AST    " Collection Time: 01/20/22  5:44 PM   Result Value Ref Range    AST 16 0 - 40 U/L   ALT    Collection Time: 01/20/22  5:44 PM   Result Value Ref Range    ALT 16 0 - 45 U/L   Creatinine    Collection Time: 01/20/22  5:44 PM   Result Value Ref Range    Creatinine 0.69 0.60 - 1.10 mg/dL    GFR Estimate >90 >60 mL/min/1.73m2   CBC with platelets    Collection Time: 01/24/22 11:40 AM   Result Value Ref Range    WBC Count 10.6 4.0 - 11.0 10e3/uL    RBC Count 3.75 (L) 3.80 - 5.20 10e6/uL    Hemoglobin 11.8 11.7 - 15.7 g/dL    Hematocrit 34.8 (L) 35.0 - 47.0 %    MCV 93 78 - 100 fL    MCH 31.5 26.5 - 33.0 pg    MCHC 33.9 31.5 - 36.5 g/dL    RDW 13.2 10.0 - 15.0 %    Platelet Count 143 (L) 150 - 450 10e3/uL   AST    Collection Time: 01/24/22 11:40 AM   Result Value Ref Range    AST 16 0 - 40 U/L   ALT    Collection Time: 01/24/22 11:40 AM   Result Value Ref Range    ALT 14 0 - 45 U/L   Creatinine    Collection Time: 01/24/22 11:40 AM   Result Value Ref Range    Creatinine 0.70 0.60 - 1.10 mg/dL    GFR Estimate >90 >60 mL/min/1.73m2   Adult Type and Screen    Collection Time: 01/24/22 11:40 AM   Result Value Ref Range    ABO/RH(D) O POS     Antibody Screen Negative Negative    SPECIMEN EXPIRATION DATE [id number]    Treponema Abs w Reflex to RPR and Titer    Collection Time: 01/24/22 11:40 AM   Result Value Ref Range    Treponema Antibody Total Nonreactive Nonreactive   Protein  random urine    Collection Time: 01/24/22 12:05 PM   Result Value Ref Range    Total Protein Urine mg/dL <7.0 mg/dL    Total Protein UR MG/MG CR      Creatinine Urine mg/dL 20 mg/dL   Asymptomatic COVID-19 Virus (Coronavirus) by PCR Nasopharyngeal    Collection Time: 01/24/22  2:42 PM    Specimen: Nasopharyngeal; Swab   Result Value Ref Range    SARS CoV2 PCR Negative Negative       Assessment:  IUP 37 w 1 d  Hypertension  Induction of labor    We discussed induction of labor process, medications used, procedures, risks and possible complications.    Questions answered to her satisfaction.    Plan:   Cytotec will be done at 15:00, according to cervical exam finding will follow with pitocin, cervidil or observation if labor has started.      OPAL CORTEZ MD

## 2022-01-26 ENCOUNTER — ANESTHESIA EVENT (OUTPATIENT)
Dept: OBGYN | Facility: CLINIC | Age: 37
End: 2022-01-26
Payer: COMMERCIAL

## 2022-01-26 ENCOUNTER — ANESTHESIA (OUTPATIENT)
Dept: OBGYN | Facility: CLINIC | Age: 37
End: 2022-01-26
Payer: COMMERCIAL

## 2022-01-26 PROCEDURE — 258N000003 HC RX IP 258 OP 636: Performed by: ADVANCED PRACTICE MIDWIFE

## 2022-01-26 PROCEDURE — 250N000011 HC RX IP 250 OP 636: Performed by: ADVANCED PRACTICE MIDWIFE

## 2022-01-26 PROCEDURE — 00HU33Z INSERTION OF INFUSION DEVICE INTO SPINAL CANAL, PERCUTANEOUS APPROACH: ICD-10-PCS | Performed by: ANESTHESIOLOGY

## 2022-01-26 PROCEDURE — 120N000001 HC R&B MED SURG/OB

## 2022-01-26 PROCEDURE — 250N000013 HC RX MED GY IP 250 OP 250 PS 637: Performed by: OBSTETRICS & GYNECOLOGY

## 2022-01-26 PROCEDURE — 250N000013 HC RX MED GY IP 250 OP 250 PS 637

## 2022-01-26 PROCEDURE — 722N000001 HC LABOR CARE VAGINAL DELIVERY SINGLE

## 2022-01-26 PROCEDURE — 370N000003 HC ANESTHESIA WARD SERVICE

## 2022-01-26 PROCEDURE — C9803 HOPD COVID-19 SPEC COLLECT: HCPCS

## 2022-01-26 PROCEDURE — 250N000009 HC RX 250: Performed by: ADVANCED PRACTICE MIDWIFE

## 2022-01-26 PROCEDURE — 258N000003 HC RX IP 258 OP 636: Performed by: OBSTETRICS & GYNECOLOGY

## 2022-01-26 PROCEDURE — 250N000009 HC RX 250: Performed by: OBSTETRICS & GYNECOLOGY

## 2022-01-26 PROCEDURE — 250N000009 HC RX 250: Performed by: ANESTHESIOLOGY

## 2022-01-26 PROCEDURE — 0HQ9XZZ REPAIR PERINEUM SKIN, EXTERNAL APPROACH: ICD-10-PCS | Performed by: OBSTETRICS & GYNECOLOGY

## 2022-01-26 PROCEDURE — 250N000011 HC RX IP 250 OP 636

## 2022-01-26 PROCEDURE — 250N000011 HC RX IP 250 OP 636: Performed by: ANESTHESIOLOGY

## 2022-01-26 PROCEDURE — 3E0R3BZ INTRODUCTION OF ANESTHETIC AGENT INTO SPINAL CANAL, PERCUTANEOUS APPROACH: ICD-10-PCS | Performed by: ANESTHESIOLOGY

## 2022-01-26 RX ORDER — ACETAMINOPHEN 325 MG/1
650 TABLET ORAL EVERY 4 HOURS PRN
Status: DISCONTINUED | OUTPATIENT
Start: 2022-01-26 | End: 2022-01-26 | Stop reason: HOSPADM

## 2022-01-26 RX ORDER — OXYTOCIN 10 [USP'U]/ML
10 INJECTION, SOLUTION INTRAMUSCULAR; INTRAVENOUS
Status: DISCONTINUED | OUTPATIENT
Start: 2022-01-26 | End: 2022-01-29 | Stop reason: HOSPADM

## 2022-01-26 RX ORDER — OXYTOCIN/0.9 % SODIUM CHLORIDE 30/500 ML
1-24 PLASTIC BAG, INJECTION (ML) INTRAVENOUS CONTINUOUS
Status: DISCONTINUED | OUTPATIENT
Start: 2022-01-26 | End: 2022-01-26 | Stop reason: HOSPADM

## 2022-01-26 RX ORDER — MISOPROSTOL 100 UG/1
25 TABLET ORAL
Status: DISCONTINUED | OUTPATIENT
Start: 2022-01-26 | End: 2022-01-26 | Stop reason: HOSPADM

## 2022-01-26 RX ORDER — NALBUPHINE HYDROCHLORIDE 10 MG/ML
2.5-5 INJECTION, SOLUTION INTRAMUSCULAR; INTRAVENOUS; SUBCUTANEOUS EVERY 6 HOURS PRN
Status: DISCONTINUED | OUTPATIENT
Start: 2022-01-26 | End: 2022-01-27

## 2022-01-26 RX ORDER — HYDROCORTISONE 2.5 %
CREAM (GRAM) TOPICAL 3 TIMES DAILY PRN
Status: DISCONTINUED | OUTPATIENT
Start: 2022-01-26 | End: 2022-01-29 | Stop reason: HOSPADM

## 2022-01-26 RX ORDER — NALOXONE HYDROCHLORIDE 0.4 MG/ML
0.2 INJECTION, SOLUTION INTRAMUSCULAR; INTRAVENOUS; SUBCUTANEOUS
Status: DISCONTINUED | OUTPATIENT
Start: 2022-01-26 | End: 2022-01-26 | Stop reason: HOSPADM

## 2022-01-26 RX ORDER — FENTANYL CITRATE 50 UG/ML
50-100 INJECTION, SOLUTION INTRAMUSCULAR; INTRAVENOUS
Status: DISCONTINUED | OUTPATIENT
Start: 2022-01-26 | End: 2022-01-26

## 2022-01-26 RX ORDER — LIDOCAINE 40 MG/G
CREAM TOPICAL
Status: DISCONTINUED | OUTPATIENT
Start: 2022-01-26 | End: 2022-01-26 | Stop reason: HOSPADM

## 2022-01-26 RX ORDER — FENTANYL CITRATE-0.9 % NACL/PF 10 MCG/ML
100 PLASTIC BAG, INJECTION (ML) INTRAVENOUS EVERY 5 MIN PRN
Status: DISCONTINUED | OUTPATIENT
Start: 2022-01-26 | End: 2022-01-26 | Stop reason: HOSPADM

## 2022-01-26 RX ORDER — LIDOCAINE HYDROCHLORIDE AND EPINEPHRINE 15; 5 MG/ML; UG/ML
INJECTION, SOLUTION EPIDURAL PRN
Status: DISCONTINUED | OUTPATIENT
Start: 2022-01-26 | End: 2022-01-26

## 2022-01-26 RX ORDER — DOCUSATE SODIUM 100 MG/1
100 CAPSULE, LIQUID FILLED ORAL DAILY
Status: DISCONTINUED | OUTPATIENT
Start: 2022-01-26 | End: 2022-01-29 | Stop reason: HOSPADM

## 2022-01-26 RX ORDER — IBUPROFEN 800 MG/1
800 TABLET, FILM COATED ORAL EVERY 6 HOURS PRN
Status: DISCONTINUED | OUTPATIENT
Start: 2022-01-26 | End: 2022-01-29 | Stop reason: HOSPADM

## 2022-01-26 RX ORDER — MISOPROSTOL 200 UG/1
400 TABLET ORAL
Status: DISCONTINUED | OUTPATIENT
Start: 2022-01-26 | End: 2022-01-29 | Stop reason: HOSPADM

## 2022-01-26 RX ORDER — ACETAMINOPHEN 325 MG/1
650 TABLET ORAL EVERY 4 HOURS PRN
Status: DISCONTINUED | OUTPATIENT
Start: 2022-01-26 | End: 2022-01-29 | Stop reason: HOSPADM

## 2022-01-26 RX ORDER — CARBOPROST TROMETHAMINE 250 UG/ML
250 INJECTION, SOLUTION INTRAMUSCULAR
Status: DISCONTINUED | OUTPATIENT
Start: 2022-01-26 | End: 2022-01-29 | Stop reason: HOSPADM

## 2022-01-26 RX ORDER — SODIUM CHLORIDE, SODIUM LACTATE, POTASSIUM CHLORIDE, CALCIUM CHLORIDE 600; 310; 30; 20 MG/100ML; MG/100ML; MG/100ML; MG/100ML
INJECTION, SOLUTION INTRAVENOUS CONTINUOUS PRN
Status: DISCONTINUED | OUTPATIENT
Start: 2022-01-26 | End: 2022-01-26 | Stop reason: HOSPADM

## 2022-01-26 RX ORDER — NALOXONE HYDROCHLORIDE 0.4 MG/ML
0.4 INJECTION, SOLUTION INTRAMUSCULAR; INTRAVENOUS; SUBCUTANEOUS
Status: DISCONTINUED | OUTPATIENT
Start: 2022-01-26 | End: 2022-01-26 | Stop reason: HOSPADM

## 2022-01-26 RX ORDER — BISACODYL 10 MG
10 SUPPOSITORY, RECTAL RECTAL DAILY PRN
Status: DISCONTINUED | OUTPATIENT
Start: 2022-01-26 | End: 2022-01-29 | Stop reason: HOSPADM

## 2022-01-26 RX ORDER — METHYLERGONOVINE MALEATE 0.2 MG/ML
200 INJECTION INTRAVENOUS
Status: DISCONTINUED | OUTPATIENT
Start: 2022-01-26 | End: 2022-01-29 | Stop reason: HOSPADM

## 2022-01-26 RX ORDER — FENTANYL CITRATE-0.9 % NACL/PF 10 MCG/ML
100 PLASTIC BAG, INJECTION (ML) INTRAVENOUS EVERY 5 MIN PRN
Status: DISCONTINUED | OUTPATIENT
Start: 2022-01-26 | End: 2022-01-26

## 2022-01-26 RX ORDER — OXYTOCIN/0.9 % SODIUM CHLORIDE 30/500 ML
340 PLASTIC BAG, INJECTION (ML) INTRAVENOUS CONTINUOUS PRN
Status: DISCONTINUED | OUTPATIENT
Start: 2022-01-26 | End: 2022-01-29 | Stop reason: HOSPADM

## 2022-01-26 RX ORDER — MISOPROSTOL 200 UG/1
800 TABLET ORAL
Status: DISCONTINUED | OUTPATIENT
Start: 2022-01-26 | End: 2022-01-29 | Stop reason: HOSPADM

## 2022-01-26 RX ORDER — MODIFIED LANOLIN
OINTMENT (GRAM) TOPICAL
Status: DISCONTINUED | OUTPATIENT
Start: 2022-01-26 | End: 2022-01-29 | Stop reason: HOSPADM

## 2022-01-26 RX ADMIN — FENTANYL CITRATE 100 MCG: 50 INJECTION INTRAMUSCULAR; INTRAVENOUS at 07:56

## 2022-01-26 RX ADMIN — LIDOCAINE HYDROCHLORIDE,EPINEPHRINE BITARTRATE 3 ML: 15; .005 INJECTION, SOLUTION EPIDURAL; INFILTRATION; INTRACAUDAL; PERINEURAL at 09:37

## 2022-01-26 RX ADMIN — Medication 10 ML/HR: at 09:41

## 2022-01-26 RX ADMIN — SODIUM CHLORIDE, POTASSIUM CHLORIDE, SODIUM LACTATE AND CALCIUM CHLORIDE: 600; 310; 30; 20 INJECTION, SOLUTION INTRAVENOUS at 11:27

## 2022-01-26 RX ADMIN — MISOPROSTOL 25 MCG: 100 TABLET ORAL at 02:27

## 2022-01-26 RX ADMIN — MAGNESIUM SULFATE IN WATER 2 G/HR: 40 INJECTION, SOLUTION INTRAVENOUS at 05:26

## 2022-01-26 RX ADMIN — Medication 2 MILLI-UNITS/MIN: at 11:28

## 2022-01-26 RX ADMIN — MISOPROSTOL 25 MCG: 100 TABLET ORAL at 04:33

## 2022-01-26 RX ADMIN — LIDOCAINE HYDROCHLORIDE 8 ML: 10 INJECTION, SOLUTION INFILTRATION; PERINEURAL at 09:42

## 2022-01-26 RX ADMIN — SODIUM CHLORIDE, POTASSIUM CHLORIDE, SODIUM LACTATE AND CALCIUM CHLORIDE 75 ML/HR: 600; 310; 30; 20 INJECTION, SOLUTION INTRAVENOUS at 03:06

## 2022-01-26 RX ADMIN — LIDOCAINE HYDROCHLORIDE 8 ML: 10 INJECTION, SOLUTION INFILTRATION; PERINEURAL at 09:37

## 2022-01-26 RX ADMIN — FENTANYL CITRATE 50 MCG: 50 INJECTION INTRAMUSCULAR; INTRAVENOUS at 06:49

## 2022-01-26 RX ADMIN — KETOROLAC TROMETHAMINE 30 MG: 30 INJECTION, SOLUTION INTRAMUSCULAR; INTRAVENOUS at 16:26

## 2022-01-26 RX ADMIN — ACETAMINOPHEN 650 MG: 325 TABLET ORAL at 04:43

## 2022-01-26 RX ADMIN — FENTANYL CITRATE 100 MCG: 50 INJECTION INTRAMUSCULAR; INTRAVENOUS at 08:56

## 2022-01-26 RX ADMIN — DOCUSATE SODIUM 100 MG: 100 CAPSULE, LIQUID FILLED ORAL at 18:44

## 2022-01-26 NOTE — PROVIDER NOTIFICATION
01/25/22 1828   Vitals   BP (!) 169/110   BP taken while patient was sitting at the side of her bed, right hand/ arm shaking had just begun.

## 2022-01-26 NOTE — PROGRESS NOTES
Labor Progress Note    Assessment/Plan  36 year old  at 37w2d gestational age here with preeclampsia here for induction of labor.  Blood pressures have remained below severe range.  Patient has completed 12 doses of Cytotec and was started on Cervidil. SROM with clear fluid ar 11PM and cervidil fell out at 9 hours, did not complete full dose. Upon recheck, spencer is low and not favorable to begin pitocin at this time.     -Start Cytotec per protocol   - Continue to monitor FHR  - Anticipate   -Continue to monitor physical exam and vital signs closely with RN  -We will update Dr. Schofield with changes    Subjective  Able to rest through contractions      Objective  Vital signs in last 24 hours  Temp:  [97.4  F (36.3  C)-99.2  F (37.3  C)] 97.4  F (36.3  C)  Resp:  [14-16] 14  BP: (128-169)/() 128/69  SpO2:  [95 %-97 %] 97 %      Physical Exam  Cervix: 1cm, 40% effaced, -3 station  FHR: Baseline 145/moderate variability/ accels/no decels; Category I tracing  [place]: Contractions Q 2-7 min    Pertinent Labs   Lab Results   Component Value Date    ABORH O POS 2022    HGB 11.8 2022    HGB 14.0 2007          Patient discussed with attending physician, Dr. Schofield , who agrees with the plan.     Katie Quintana MD PGY1 2022  [facility]

## 2022-01-26 NOTE — PROGRESS NOTES
"OB Progress Note    S. Pt starting pushing efforts. She is comfortable with an epidural, but does feel the start of the contractions. Not feeling much pressure.  at the bedside and supportive.  Mag infusing for severe pre-eclampsia    O.  Vital signs:  Temp: 98.2  F (36.8  C) Temp src: Oral BP: 112/60     Resp: 16 SpO2: 95 %     Height: 172.7 cm (5' 8\") Weight: 90.7 kg (200 lb)  Estimated body mass index is 30.41 kg/m  as calculated from the following:    Height as of this encounter: 1.727 m (5' 8\").    Weight as of this encounter: 90.7 kg (200 lb).    GEN: NAD  CE: Complete, +2 station    Fetal status: mild caput noted on exam  130bpm, minimal variability, + recurrent variable decels    A/P  Pt is a 35yo  at 37w2d ga undergoing IOL for severe pre-eclampsia  -- Continue pushing efforts, has been pushing for about 1hr 20 mins, attempting different position changes  -- Continue pitocin per protocol  -- Continue mag sulfate     "

## 2022-01-26 NOTE — PROGRESS NOTES
RN at bedside for 15 minutes following Nitrous Oxide 50/50 inhalation initiation. Patient is observed using the equipment appropriately. Patient appears to be coping with labor. Patient is free of side effects.    Dionne Bragg RN

## 2022-01-26 NOTE — L&D DELIVERY NOTE
Vaginal Delivery Note    Name: Suzette Rivera  : 1985  MRN: [id number]     PRE DELIVERY DIAGNOSIS  1) 36 year old  1 Para 0 at 37w2d      2) Pre-eclampsia with severe features  3) AMA  4) IVF pregnancy    POST DELIVERY DIAGNOSIS  1) 36 year old  @ 37w2d  2) Delivery of a viable baby girl @ 1544 weighing 2950 g, with apgars 4, 6, 7 at 1, 5, 10 mins respectively   3) Arterial gas 7.16, base excess -2.1    Induction yes                       Indication: severe pre-e    Monitors: IUPC, external      GBS: negative    ROM: SROM  Fluid Type: clear    Labor Analgesia/Anesthesia: fentanyl, epidural     Cord pH obtained: yes  Placenta submitted to Pathology: no    Description of procedure:   36 year old  with PNC w/ [facility] and pregnancy complicated by IVF pregnancy, and AMA presented to L&D after being sent from clinic for evaluation of blood pressures, she was then found to have severe range Bps and diagnosed with severe pre-eclampsia. She was started on induction with cytototec, then received cervidil. She SROM'd and pitocin was started to aid in contraction frequency. Patient made good cervical change from 4 to 8 and then 10cm. She did start having variable decels when she was complete. There were repetitive, but patient was making good progres with pushing to +2 station. Different position changes were attempted so she could get a better feel with pushing. Fetus always had good return to baseline in between variable decels, minimal variability was present during pushing. Pt then quickly progress from +3 station and in only 2 contractions delivered a viable female  from a compound KERRIE presentation with one nuchal that was reduced.  team present for delivery, baby initially gave a weak cry with poor tone, NNP assessed baby girl and started CPAP, [facility] transferred to [facility].  Gases show arterial pH 7.16     Infant spontaneously delivered over a 1st degree tear and a right  labial tear.   It was repaired in the normal fashion using 2-0 and 3-0 vicryl .      Placenta spontaneously delivered: 1/26/2022  3:47 PM  grossly intact with 3 vessel cord.  Infant:      Delivery was complicated by nothing.  Interventions included pitocin and fundal massage.    Delivery QBL (mL): 600    Mother and Infant stable in SCN.    Nimco Holder DO      1/26/2022 4:20 PM

## 2022-01-26 NOTE — PLAN OF CARE
Problem: Adult Inpatient Plan of Care  Goal: Optimal Comfort and Wellbeing  1/26/2022 0717 by Dionne Bragg RN  Outcome: Improving  Problem: Change in Fetal Wellbeing (Labor)  Goal: Stable Fetal Wellbeing  1/26/2022 0717 by Dionne Bragg RN  Outcome: Improving     Problem: Uterine Tachysystole (Labor)  Goal: Normal Uterine Contraction Pattern  1/26/2022 0717 by Dionne Bragg RN  Outcome: Improving     Intervention: Provide Person-Centered Care  Recent Flowsheet Documentation  Taken 1/26/2022 0225 by Dionne Bragg RN  Trust Relationship/Rapport:   care explained   choices provided   emotional support provided   empathic listening provided   questions answered   questions encouraged   reassurance provided   thoughts/feelings acknowledged     Problem: Hypertensive Disorders in Pregnancy  Goal: Maternal-Fetal Stabilization  1/26/2022 0717 by Dionne Bragg RN  Outcome: Improving  Intervention: Optimize Blood Pressure and Fluid Status  Recent Flowsheet Documentation  Taken 1/25/2022 2000 by Dionne Bragg RN  Fluid/Electrolyte Management: fluids provided     VSS, BP's WDL throughout the night. Patient's headache improved after dose of acetaminophen. Denies visual disturbances and epigastric pain. Reflexes +2, no beats of clonus noted. FHR baseline 135-145 with periods of both minimal and moderate variability. Fatoumata every 3-7 minutes.     Patient reported increasing discomfort after 0435 dose of misoprostol. Patient requested cervical exam, SVE 3-4/70-80%/-2. OB Resident updated. Nitrous oxide used briefly, patient then requested Fentanyl Fentanyl given per MAR with relief. Partner supportive at bedside. Report given to oncoming RN.

## 2022-01-26 NOTE — ANESTHESIA PROCEDURE NOTES
Epidural catheter Procedure Note    Pre-Procedure   Staff -        Anesthesiologist:  Dez Carrillo MD       Performed By: anesthesiologist       Location: OB       Procedure Start/Stop Times: 1/26/2022 9:30 AM and 1/26/2022 9:47 AM       Pre-Anesthestic Checklist: patient identified, IV checked, risks and benefits discussed, informed consent, monitors and equipment checked, pre-op evaluation, at physician/surgeon's request and post-op pain management  Timeout:       Correct Patient: Yes        Correct Procedure: Yes        Correct Site: Yes        Correct Position: Yes   Procedure Documentation  Procedure: epidural catheter       Patient Position: sitting       Skin prep: Chloraprep       Local skin infiltrated with 4 mL of 1% lidocaine.        Insertion Site: L2-3. (midline approach).       Needle Type: [employer]       Needle Gauge: 18.        Needle Length (Inches): 3.5         Catheter threaded easily.        # of attempts: 1 and  # of redirects:     Assessment/Narrative         Paresthesias: No.      Test dose of 3 mL at 09:37 CST.         Test dose negative, 3 minutes after injection, for signs of intravascular, subdural, or intrathecal injection.       Aspiration negative for Heme or CSF via Epidural Catheter.    Medication(s) Administered   Medication Administration Time: 1/26/2022 9:42 AM     Comments:  No complications noted

## 2022-01-26 NOTE — PROGRESS NOTES
Labor Progress Note    Assessment/Plan  Suzette Rivera is a 36 year old  who is 37w1d here with preeclampsia here for induction of labor.  Blood pressures have remained below severe range.  Patient has completed 12 doses of Cytotec and was started on Cervidil.  After starting Cervidil patient was noted to have shaking of right hand and arm along with increased intensity of contractions.  Patient reports no significant headache, no vision changes, no right upper quadrant pain, no shortness of breath, no chest pain, no lightheadedness or dizziness, no weakness, no n/v.  Patient has had appropriate urine output.  Blood pressure at this time was 169/110 and upon recheck was 155/100 which is under the threshold to treat.  Patient is on magnesium and shows no signs of magnesium toxicity.  On exam patient is oriented x3 and at baseline mental status, tremors are noted in right hand > left hand, reflexes are wnl, patient has 2 beats of clonus bilaterally.  Tremor improved to only trace notable at fingertips within 5 minutes of initial exam.  The shaking is likely physiologic however given preeclampsia magnesium level was checked and was within therapeutic dose range.  Dr. Schofield updated and agrees with plan.    - Continue to monitor FHR  - Anticipate   -Serum magnesium level  -Continue to monitor physical exam and vital signs closely with RN  -We will update Dr. Schofield with changes    Subjective  Noted shaking of right hand which she has never had before after Cervidil placement.  The shaking was worse initially and has improved since.  Patient also feels increased contraction intensity the pain is very tolerable. Patient reports no significant headache, no vision changes, no right upper quadrant pain, no shortness of breath, no chest pain, no lightheadedness or dizziness, no weakness, no n/v.       Objective  Vital signs in last 24 hours  Temp:  [97.3  F (36.3  C)-98.5  F (36.9  C)] 98.5  F (36.9  C)  Resp:  [16]  16  BP: (117-169)/() 155/100  SpO2:  [94 %-98 %] 95 %      Physical Exam  General: Alert, awake, aware, oriented x3.  Laying comfortably in bed  Abd: Gravid, soft, nontender to palpation throughout  FHR: Baseline 135/moderate variability/ accels/no decels; Category I tracing  [place]: Contractions Q 2-3 min  Neuro: Lower extremity reflexes bilaterally wnl, 2 beats of clonus in both feet. CN II - XII normal.   Extremities: Moderate tremor noted in right hand, mild tremor in left hand. No peripheral edema    Pertinent Labs   Lab Results   Component Value Date    ABORH O POS 01/24/2022    HGB 11.8 01/24/2022    HGB 14.0 09/29/2007            Patient discussed with attending physician, Dr. Schofield , who agrees with the plan.     Katie Quintana MD PGY1 1/25/2022  [facility]

## 2022-01-27 LAB
HGB BLD-MCNC: 8.7 G/DL (ref 11.7–15.7)
HGB BLD-MCNC: 8.8 G/DL (ref 11.7–15.7)
MAGNESIUM SERPL-MCNC: 5.7 MG/DL (ref 1.8–2.6)

## 2022-01-27 PROCEDURE — 250N000013 HC RX MED GY IP 250 OP 250 PS 637: Performed by: OBSTETRICS & GYNECOLOGY

## 2022-01-27 PROCEDURE — 83735 ASSAY OF MAGNESIUM: CPT | Performed by: OBSTETRICS & GYNECOLOGY

## 2022-01-27 PROCEDURE — 36415 COLL VENOUS BLD VENIPUNCTURE: CPT | Performed by: OBSTETRICS & GYNECOLOGY

## 2022-01-27 PROCEDURE — 120N000001 HC R&B MED SURG/OB

## 2022-01-27 PROCEDURE — 250N000011 HC RX IP 250 OP 636: Performed by: ADVANCED PRACTICE MIDWIFE

## 2022-01-27 PROCEDURE — 85018 HEMOGLOBIN: CPT | Performed by: OBSTETRICS & GYNECOLOGY

## 2022-01-27 RX ORDER — POLYETHYLENE GLYCOL 3350 17 G/17G
17 POWDER, FOR SOLUTION ORAL DAILY
Status: DISCONTINUED | OUTPATIENT
Start: 2022-01-27 | End: 2022-01-29 | Stop reason: HOSPADM

## 2022-01-27 RX ADMIN — MAGNESIUM SULFATE IN WATER 2 G/HR: 40 INJECTION, SOLUTION INTRAVENOUS at 02:00

## 2022-01-27 RX ADMIN — DOCUSATE SODIUM 100 MG: 100 CAPSULE, LIQUID FILLED ORAL at 08:41

## 2022-01-27 RX ADMIN — IBUPROFEN 800 MG: 800 TABLET ORAL at 05:57

## 2022-01-27 RX ADMIN — POLYETHYLENE GLYCOL 3350 17 G: 17 POWDER, FOR SOLUTION ORAL at 08:40

## 2022-01-27 RX ADMIN — IBUPROFEN 800 MG: 800 TABLET ORAL at 16:31

## 2022-01-27 ASSESSMENT — MIFFLIN-ST. JEOR: SCORE: 1628.47

## 2022-01-27 NOTE — PROGRESS NOTES
Pt on toilet when RN entering room at start of shift. Pt c/o feeling lightheaded. Pt able to get up and to bed with standby assist x2. Pt supine in trendelenburg position. States she felt immediately better. BP taken and in VS flowsheet. Will continue to monitor and reassess.

## 2022-01-27 NOTE — PROGRESS NOTES
"Vaginal Delivery Postpartum Day 1    Patient Name:  Suzette Rivera  :      1985  MRN:      [id number]      Assessment:  Postpartum day 1 s/p . Induced secondary to severe preeclampsia. Acute postpartum anemia.     Plan:  Magnesium Sulfate discontinued. Will continue to monitor blood pressures.             Await repeat Hgb.             Routine postpartum care.    Subjective:  The patient feels well:  Voiding without difficulty, lochia normal, tolerating normal diet, and passing flatus.  Minimal discomfort. Pain is well controlled with current medications. Denies lightheadedness or dizziness. The baby in special care nursery.    Objective:  /83   Pulse 96   Temp 97.9  F (36.6  C)   Resp 18   Ht 1.727 m (5' 8\")   Wt 88.5 kg (195 lb 1.6 oz)   SpO2 98%   Breastfeeding Yes   BMI 29.66 kg/m    Patient Vitals for the past 24 hrs:   BP Temp Temp src Pulse Resp SpO2 Weight   22 1541 136/83 -- -- -- -- -- --   22 1431 132/75 97.9  F (36.6  C) -- -- 18 98 % --   22 1235 124/83 -- -- -- -- -- --   22 1051 125/78 -- -- -- -- -- --   22 1000 -- 98  F (36.7  C) -- -- 16 -- --   22 0844 -- -- -- -- -- 98 % --   22 0839 -- -- -- -- -- 97 % --   22 0800 -- -- -- -- 18 94 % --   22 0734 110/70 98.8  F (37.1  C) Oral 96 16 -- --   22 0659 -- -- -- -- -- 95 % --   22 0654 -- -- -- -- -- 96 % --   22 0649 -- -- -- -- -- 95 % --   22 0644 -- -- -- -- -- 96 % --   22 -- -- -- -- -- 96 % --   22 -- -- -- -- -- 96 % --   22 -- -- -- -- -- 94 % --   22 -- -- -- -- -- 94 % --   22 -- -- -- -- -- 95 % --   22 -- -- -- -- -- 97 % 88.5 kg (195 lb 1.6 oz)   22 -- -- -- -- -- 97 % --   22 -- -- -- -- -- 97 % --   22 -- -- -- -- -- 97 % --   22 -- -- -- -- -- 97 % --   22 -- -- -- -- -- 96 % --   22 -- -- -- -- -- 98 " % --   01/27/22 0557 137/86 -- -- -- -- -- --   01/27/22 0554 -- -- -- -- -- 98 % --   01/27/22 0546 -- -- -- -- -- 97 % --   01/27/22 0541 -- -- -- -- -- 96 % --   01/27/22 0536 -- -- -- -- -- 95 % --   01/27/22 0531 -- -- -- -- -- 96 % --   01/27/22 0526 -- -- -- -- -- 95 % --   01/27/22 0521 -- -- -- -- -- 97 % --   01/27/22 0516 -- -- -- -- -- 98 % --   01/27/22 0511 -- -- -- -- -- 96 % --   01/27/22 0506 -- -- -- -- -- 96 % --   01/27/22 0501 -- -- -- -- -- 96 % --   01/27/22 0456 -- -- -- -- -- 95 % --   01/27/22 0451 -- -- -- -- -- 96 % --   01/27/22 0446 -- -- -- -- -- 96 % --   01/27/22 0441 -- -- -- -- -- 97 % --   01/27/22 0436 -- -- -- -- -- 96 % --   01/27/22 0431 -- -- -- -- -- 97 % --   01/27/22 0426 -- -- -- -- -- 96 % --   01/27/22 0424 -- -- -- -- -- 94 % --   01/27/22 0421 -- -- -- -- -- 96 % --   01/27/22 0416 -- -- -- -- -- 96 % --   01/27/22 0411 -- -- -- -- -- 96 % --   01/27/22 0406 -- -- -- -- -- 96 % --   01/27/22 0401 -- -- -- -- -- 97 % --   01/27/22 0356 -- -- -- -- -- 96 % --   01/27/22 0325 109/66 -- -- -- -- -- --   01/27/22 0321 -- -- -- -- -- 95 % --   01/27/22 0316 -- -- -- -- -- 95 % --   01/27/22 0311 -- -- -- -- -- 95 % --   01/27/22 0306 -- -- -- -- -- 96 % --   01/27/22 0305 -- -- -- -- -- 94 % --   01/27/22 0301 -- -- -- -- -- 95 % --   01/27/22 0256 -- -- -- -- -- 95 % --   01/27/22 0251 -- -- -- -- -- 96 % --   01/27/22 0246 -- -- -- -- -- 95 % --   01/27/22 0241 -- -- -- -- -- 96 % --   01/27/22 0236 -- -- -- -- -- 95 % --   01/27/22 0231 -- -- -- -- -- 95 % --   01/27/22 0226 -- -- -- -- -- 95 % --   01/27/22 0221 -- -- -- -- -- 95 % --   01/27/22 0216 -- -- -- -- -- 95 % --   01/27/22 0211 -- -- -- -- -- 95 % --   01/27/22 0206 -- -- -- -- -- 96 % --   01/27/22 0201 -- -- -- -- -- 96 % --   01/27/22 0156 -- -- -- -- -- 96 % --   01/27/22 0151 -- -- -- -- -- 96 % --   01/27/22 0146 -- -- -- -- -- 97 % --   01/27/22 0141 -- -- -- -- -- 96 % --   01/27/22 0136 -- -- -- --  -- 96 % --   01/27/22 0131 -- -- -- -- -- 96 % --   01/27/22 0126 -- -- -- -- -- 95 % --   01/27/22 0121 -- -- -- -- -- 96 % --   01/27/22 0116 -- -- -- -- -- 98 % --   01/27/22 0111 -- -- -- -- -- 96 % --   01/27/22 0106 -- -- -- -- -- 96 % --   01/27/22 0101 -- -- -- -- -- 96 % --   01/27/22 0100 -- 98.6  F (37  C) Oral -- -- -- --   01/27/22 0050 -- -- -- -- -- 96 % --   01/27/22 0047 (!) 140/81 -- -- -- -- 93 % --   01/27/22 0045 -- -- -- -- -- 96 % --   01/27/22 0040 -- -- -- -- -- 94 % --   01/27/22 0036 -- -- -- -- -- 94 % --   01/27/22 0035 -- -- -- -- -- 96 % --   01/27/22 0030 -- -- -- -- -- 96 % --   01/27/22 0029 -- -- -- -- -- 93 % --   01/27/22 0025 -- -- -- -- -- 95 % --   01/27/22 0022 -- -- -- -- -- 94 % --   01/27/22 0020 -- -- -- -- -- 94 % --   01/27/22 0015 -- -- -- -- -- 100 % --   01/27/22 0010 -- -- -- -- -- 95 % --   01/27/22 0005 -- -- -- -- -- 96 % --   01/27/22 0000 -- -- -- -- -- 96 % --   01/26/22 2355 -- -- -- -- -- 95 % --   01/26/22 2350 -- -- -- -- -- 95 % --   01/26/22 2345 -- -- -- -- -- 95 % --   01/26/22 2340 -- -- -- -- -- 95 % --   01/26/22 2335 -- -- -- -- -- 95 % --   01/26/22 2330 -- -- -- -- -- 95 % --   01/26/22 2329 -- -- -- -- -- 94 % --   01/26/22 2327 123/80 -- -- -- -- -- --   01/26/22 2325 -- -- -- -- -- 95 % --   01/26/22 2323 -- -- -- -- -- 94 % --   01/26/22 2320 -- -- -- -- -- 95 % --   01/26/22 2315 -- -- -- -- -- 95 % --   01/26/22 2312 -- -- -- -- -- 94 % --   01/26/22 2310 -- -- -- -- -- 95 % --   01/26/22 2307 -- -- -- -- -- 94 % --   01/26/22 2305 -- -- -- -- -- 94 % --   01/26/22 2301 -- -- -- -- -- 94 % --   01/26/22 2300 -- -- -- -- -- 94 % --   01/26/22 2256 -- -- -- -- -- 94 % --   01/26/22 2255 -- -- -- -- -- 94 % --   01/26/22 2250 -- -- -- -- -- 95 % --   01/26/22 2245 -- -- -- -- -- 95 % --   01/26/22 2240 -- -- -- -- -- 96 % --   01/26/22 2235 -- -- -- -- -- 96 % --   01/26/22 2231 -- -- -- -- -- 92 % --   01/26/22 2230 -- -- -- -- -- 97 % --    22 -- -- -- -- -- 96 % --   22 -- -- -- -- -- 96 % --   22 -- -- -- -- -- 93 % --   22 -- -- -- -- -- 97 % --   22 -- -- -- -- -- 97 % --   22 -- -- -- -- -- 97 % --   22 -- -- -- -- -- 96 % --   22 -- -- -- -- -- 97 % --   22 -- -- -- -- -- 97 % --   22 -- -- -- -- -- 97 % --   22 -- -- -- -- -- 97 % --   22 (!) 142/87 98.8  F (37.1  C) Oral -- 18 -- --   22 120/74 -- -- -- -- -- --   22 1917 122/73 -- -- -- -- -- --       The amount and color of the lochia is appropriate for the duration of recovery.  The uterine fundus is firm.  Urinary output is adequate.     Lab Results   Component Value Date    AS Negative 2022    HGB 8.7 (L) 2022       Immunization History   Administered Date(s) Administered     DTAP (<7y) 1986, 1986, 1987, 1988, 1991     Hep B, Peds or Adolescent 1996, 1996, 1997     HepA-Adult 10/18/2010, 2013     Hib (PRP-T) 1988     Hib, Unspecified 1988     Historical DTP/aP 1986, 1986, 1987, 1988, 1991     Influenza (H1N1) 2009     Influenza Intranasal Vaccine 10/18/2010     Influenza Vaccine IM > 6 months Valent IIV4 (Alfuria,Fluzone) 2016     MMR 1987, 10/26/1987, 1998     Meningococcal (Menomune ) 2004     OPV, trivalent, live 1986, 1986, 1988, 1991     Polio, Unspecified  1986, 1986, 1988, 1991     TDAP Vaccine (Boostrix) 2021     Td (Adult), Adsorbed 1998     Tdap (Adacel,Boostrix) 2008     Typhoid IM 10/18/2010     Varicella 2008       Provider:  Man Ferreira MD    Date:  2022  Time:  5:33 PM    Patient Name:  Suzette Rivera  :  1985  MRN:  [id number]

## 2022-01-27 NOTE — CONSULTS
Integrative Therapy Consult    Healing PresenceYes  Essential Oils: Topical (EO/Topical Oil)     Anita -  HC,Lavender Massage Oil - HC       Healing Music:       Breathwork:       Guided Imagery:       Acupressure:       Oshibori:       Energy Therapy:       Healing Touch:       Reiki:       Qi Gong:     Massage: Foot      Targeted Massage:    Sleep Promotion:       Other Therapy:       Intervention Reason: Edema     Pre and Post Session Scores: Patient Desires Treatment: yes                             Delivery:         Referrals:      Yoselin Herndon

## 2022-01-27 NOTE — ANESTHESIA POSTPROCEDURE EVALUATION
Patient: Suzette Rivera    Procedure: * No procedures listed *       Diagnosis:* No pre-op diagnosis entered *  Diagnosis Additional Information: No value filed.    Anesthesia Type:  Epidural    Note:  Disposition: Inpatient   Postop Pain Control: Uneventful            Sign Out: Well controlled pain   PONV: No   Neuro/Psych: Uneventful            Sign Out: Acceptable/Baseline neuro status   Airway/Respiratory: Uneventful            Sign Out: Acceptable/Baseline resp. status   CV/Hemodynamics: Uneventful            Sign Out: Acceptable CV status; No obvious hypovolemia; No obvious fluid overload   Other NRE: NONE   DID A NON-ROUTINE EVENT OCCUR?     Event details/Postop Comments:  No apparent complications from anesthesia.  Patient reports that she did get lightheaded when going to the bathroom, was lying flat in bed when I saw her.  No HA.             Last vitals:  Vitals Value Taken Time   /74 01/26/22 1931   Temp     Pulse     Resp     SpO2     Vitals shown include unvalidated device data.    Electronically Signed By: Winnie Flores MD  January 26, 2022  7:40 PM

## 2022-01-27 NOTE — CONSULTS
"ACUPUNCTURIST TREATMENT NOTE    Name: Suzette Rivera  :  1985  MRN:  [id number]    Acupuncture Treatment  Patient Type: Maternity  Intervention Reason: Lactation,Pain  Pain Location: Perineum  Pre-session Pain Ratin  Post-session Pain Ratin  Patient complaint:: Insufficient lactation, pain from delivery  Initial insertions: Jerod 17, Si 1, Koki 5, Sp 6, St 36     \"Risks and benefits of acupuncture were discussed with patient. Consent for treatment was given. We thank you for the referral.\"     Sami Escamilla    Date:  2022  Time:  11:43 AM    "

## 2022-01-27 NOTE — PROGRESS NOTES
Lab came up to room at 1400,  told to return at 1430.  RN looked at 1545, no result noted and no lab draw noted.  Lab immediately called to come draw. At this time no Hgb drawn.

## 2022-01-27 NOTE — PLAN OF CARE
"  Problem: Urinary Retention (Postpartum Vaginal Delivery)  Goal: Effective Urinary Elimination  Outcome: No Change  Intervention: Promote Effective Urinary Elimination  Recent Flowsheet Documentation  Taken 1/27/2022 1000 by Caron Houston RN  Urinary Elimination Promotion: absorbent pad/diaper use encouraged   Pt had incontinence.  Encouraged pt to try to empty bladder more frequently and continue to drink fluids.    Problem: Hypertensive Disorders in Pregnancy  Goal: Maternal-Fetal Stabilization  Outcome: Improving     Problem: Hypertensive Disorders in Pregnancy  Goal: Maternal-Fetal Stabilization  Outcome: Improving     Problem: Adjustment to Role Transition (Postpartum Vaginal Delivery)  Goal: Successful Maternal Role Transition  Outcome: Improving     Problem: Bleeding (Postpartum Vaginal Delivery)  Goal: Hemostasis  Outcome: Improving   VSS, Pt continues to be on MagSo4.  Pt does c/o blurred vision \"like I can't focus from far away to closeup.\"  Pt encouraged to update RN if condition worsens.  Pt also is pumping every 2 hours and had time in the SCN this morning.    "

## 2022-01-28 VITALS
SYSTOLIC BLOOD PRESSURE: 139 MMHG | DIASTOLIC BLOOD PRESSURE: 90 MMHG | HEART RATE: 85 BPM | OXYGEN SATURATION: 98 % | TEMPERATURE: 98.4 F | WEIGHT: 195.1 LBS | HEIGHT: 68 IN | RESPIRATION RATE: 16 BRPM | BODY MASS INDEX: 29.57 KG/M2

## 2022-01-28 PROCEDURE — 250N000013 HC RX MED GY IP 250 OP 250 PS 637: Performed by: OBSTETRICS & GYNECOLOGY

## 2022-01-28 RX ORDER — DOCUSATE SODIUM 100 MG/1
100 CAPSULE, LIQUID FILLED ORAL DAILY
Status: ON HOLD | COMMUNITY
Start: 2022-01-28 | End: 2024-01-24

## 2022-01-28 RX ORDER — ACETAMINOPHEN 325 MG/1
650 TABLET ORAL EVERY 4 HOURS PRN
Status: ON HOLD | COMMUNITY
Start: 2022-01-28 | End: 2024-01-24

## 2022-01-28 RX ORDER — IBUPROFEN 800 MG/1
800 TABLET, FILM COATED ORAL EVERY 8 HOURS PRN
Status: ON HOLD | COMMUNITY
Start: 2022-01-28 | End: 2024-01-24

## 2022-01-28 RX ADMIN — IBUPROFEN 800 MG: 800 TABLET ORAL at 16:44

## 2022-01-28 RX ADMIN — IBUPROFEN 800 MG: 800 TABLET ORAL at 10:01

## 2022-01-28 RX ADMIN — POLYETHYLENE GLYCOL 3350 17 G: 17 POWDER, FOR SOLUTION ORAL at 10:01

## 2022-01-28 RX ADMIN — DOCUSATE SODIUM 100 MG: 100 CAPSULE, LIQUID FILLED ORAL at 10:01

## 2022-01-28 NOTE — CONSULTS
"ACUPUNCTURIST TREATMENT NOTE    Name: Suzette Rivera  :  1985  MRN:  [id number]    Acupuncture Treatment  Patient Type: Maternity  Intervention Reason: Lactation  Patient complaint:: insufficient lactation  Initial insertions: Du 20, Jerod 17, GB 21, LI 4, SI 1, St 36, Sp 6, Koki 3         \"Risks and benefits of acupuncture were discussed with patient. Consent for treatment was given. We thank you for the referral.\"     Lindy Bonner L.Ac.     Date:  2022  Time:  11:09 AM    "

## 2022-01-28 NOTE — PLAN OF CARE
Problem: Hypertensive Disorders in Pregnancy  Goal: Maternal-Fetal Stabilization  Outcome: Completed   Vital signs are stable. Discussed danger signs with high blood pressure.

## 2022-01-28 NOTE — DISCHARGE SUMMARY
OB Discharge Summary      Date:  2022    Name:  Suzette Rivera  :  1985  MRN:  [id number]      Admission Date:  2022  Delivery Date: 22  Gestational Age at Delivery:  37w2d  Discharge Date:  2022    Principal Diagnosis:    Patient Active Problem List   Diagnosis     Elevated blood pressure affecting pregnancy in third trimester, antepartum     Severe pre-eclampsia in third trimester     Pregnancy resulting from in vitro fertilization in third trimester     Encounter for induction of labor           Conditions complicating Pregnancy:AMA, IVF pregnancy, hypertension that has progressed to severe range preeclampsia, chronic anemia      Procedure(s) Performed: cytotec and pitocin IOL, epidural, IUPC, FSE, 1st degree and right labial tear,     Indication for : none  Indication for Induction:  Severe preeclampsia     Condition at Discharge: Good    Discharge Medications:      Review of your medicines      START taking      Dose / Directions   acetaminophen 325 MG tablet  Commonly known as: TYLENOL  Used for:  (normal spontaneous vaginal delivery)      Dose: 650 mg  Take 2 tablets (650 mg) by mouth every 4 hours as needed for mild pain or fever  Refills: 0     docusate sodium 100 MG capsule  Commonly known as: COLACE  Used for:  (normal spontaneous vaginal delivery)      Dose: 100 mg  Take 1 capsule (100 mg) by mouth daily  Refills: 0     ibuprofen 800 MG tablet  Commonly known as: ADVIL/MOTRIN  Used for:  (normal spontaneous vaginal delivery)      Dose: 800 mg  Take 1 tablet (800 mg) by mouth every 8 hours as needed for other (cramping)  Refills: 0        CONTINUE these medicines which have NOT CHANGED      Dose / Directions   Ferrous Sulfate 324 (65 Fe) MG Tbec      Take by mouth every other day  Refills: 0     prenatal multivitamin  plus iron 27-1 MG Tabs      Take by mouth daily  Refills: 0        STOP taking    aspirin 81 MG chewable tablet  Commonly known as:  ASA        cetirizine 10 MG tablet  Commonly known as: zyrTEC              Where to get your medicines      Some of these will need a paper prescription and others can be bought over the counter. Ask your nurse if you have questions.    You don't need a prescription for these medications    acetaminophen 325 MG tablet    docusate sodium 100 MG capsule    ibuprofen 800 MG tablet          Discharge Plan:    Follow up with /TSERING: Monday or Tuesday with MD and 6 [place] [facility] [place]   Patient Instructions:      Physical activity:As tolerated. Nothing in the vagina for 6 weeks.      Diet:. As tolerated    Medication: see above          Physician/CNM:  MICHAEL Dave CNM    Name:  Suzette Rivera  :  1985  MRN:  [id number]

## 2022-01-28 NOTE — LACTATION NOTE
This note was copied from a baby's chart.  F/u done with Suzette in regard to feedings. Eliza has been very sleepy at feeding times. Suzette had some expressed milk in a syringe and even offering that with a finger was not successful.Using a slow flow bottle,formula was introduced and Eliza slowly took 10mls of this. Afterwards, Suzette was able to pump with her Symphony pump for EBM for next feeding. To continue to follow while inpt. Skin to skin contact was encouraged between feedings as well.

## 2022-01-28 NOTE — PROGRESS NOTES
"Outreach Note for EPIC    Chart reviewed, discharge plan discussed with patient, needs assessed. Patient verbalizes understanding of plan, requests [facility] visit as ordered, [facility] nurse visit planned for Sat or Sun depending on NB discharge date, Home Care Intake updated. Patient and , \"Eliza\", phone number is reported as correct in EMR. Visit will be self pay related to insurance coverage.     Patient states she has good support at home, has baby care essentials, and feels ready to discharge today. Outreach RN will continue to follow and assist if needed with discharge plan. No additional needs identified at this time.        "

## 2022-01-28 NOTE — PROGRESS NOTES
"Vaginal Delivery Postpartum Day 2    Patient Name:  Suzette Rivera  :      1985  MRN:      [id number]      Assessment:  Normal postpartum course, labor complicated severe preeclamsia. Acute on chronic anemia secondary to blood loss.    Plan:  Continue current care and discharge to home late this afternoon if BPs continue to be stable.    Subjective:  The patient feels well:  Voiding without difficulty, some incontinence, lochia normal, tolerating normal diet, and passing flatus.  She denies headache, vision changes, URQ/epigastric pain, dizziness, lightheadedness, shortness of breath, and tachycardia.  Pain is well controlled with current medications.  The patient has no emotional concerns.  The baby is in SCN  and being fed by breast.    Objective:  BP (!) 143/84   Pulse 91   Temp 98.8  F (37.1  C) (Oral)   Resp 16   Ht 1.727 m (5' 8\")   Wt 88.5 kg (195 lb 1.6 oz)   SpO2 98%   Breastfeeding Yes   BMI 29.66 kg/m      .  Patient Vitals for the past 24 hrs:   BP Temp Temp src Pulse Resp SpO2   22 0440 (!) 143/84 98.8  F (37.1  C) Oral 91 -- --   22 0035 119/77 98.7  F (37.1  C) Oral 76 16 98 %   227 133/76 98  F (36.7  C) Oral 86 -- 97 %   22 1541 136/83 -- -- -- -- --   22 1431 132/75 97.9  F (36.6  C) -- -- 18 98 %   22 1235 124/83 -- -- -- -- --   22 1051 125/78 -- -- -- -- --   22 1000 -- 98  F (36.7  C) -- -- 16 --       The amount and color of the lochia is appropriate for the duration of recovery.  The uterine fundus is firm.  Urinary output is adequate.     Lab Results   Component Value Date    AS Negative 2022    HGB 8.8 (L) 2022       Immunization History   Administered Date(s) Administered     DTAP (<7y) 1986, 1986, 1987, 1988, 1991     Hep B, Peds or Adolescent 1996, 1996, 1997     HepA-Adult 10/18/2010, 2013     Hib (PRP-T) 1988     Hib, Unspecified 1988     " Historical DTP/aP 1986, 1986, 1987, 1988, 1991     Influenza (H1N1) 2009     Influenza Intranasal Vaccine 10/18/2010     Influenza Vaccine IM > 6 months Valent IIV4 (Alfuria,Fluzone) 2016     MMR 1987, 10/26/1987, 1998     Meningococcal (Menomune ) 2004     OPV, trivalent, live 1986, 1986, 1988, 1991     Polio, Unspecified  1986, 1986, 1988, 1991     TDAP Vaccine (Boostrix) 2021     Td (Adult), Adsorbed 1998     Tdap (Adacel,Boostrix) 2008     Typhoid IM 10/18/2010     Varicella 2008       Provider:  MICHAEL Dave CNM       Date:  2022  Time:  9:40 AM    Patient Name:  Suzette Rivera  :      1985  MRN:      [id number]

## 2022-01-29 ENCOUNTER — LACTATION ENCOUNTER (OUTPATIENT)
Age: 37
End: 2022-01-29

## 2022-01-29 NOTE — LACTATION NOTE
This note was copied from a baby's chart.  Suzette called for assistance with breastfeeding and to assess latch and transfer.  Baby Eliza was waking to feed, placed her belly to belly with mom in cross cradle.  Baby latched on second attempt with assistance without nipple shield.  Latch was comfortable, lips were flanged with a wide gape.  Swallows weren't audible, but were noted every 2-3 sucks, more with breast compression.  Baby latched and fed for 15 minutes requiring stimulation to keep baby feeding.  Baby too sleepy to feed on second breast.  Passed baby to dad to supplement while set up mom with pumping.  Using 24mm flanges, encouraged her to apply nipple cream prior to pumping to lubricate for a more comfortable pump.  [facility] well as resources in education folder and breast feeding essentials book.  Will follow up as needed.

## 2022-01-29 NOTE — LACTATION NOTE
This note was copied from a baby's chart.  Follow up with Suzette to see how breast feeding is going.  Baby hs been in the room since yesterday and she feels that feedings are going better.  She breastfeeds using the nipple shield for about 15 minutes then supplements with mbm/formula and pumps.  She's getting up to 10 ml pumping now and baby's taking 25-28ml of total supplement.  She has a Spectra breast pump for use at home and has the cheat sheet and adapter information.  Baby will feed at 1100 and will follow up to assess latch and transfer at that time.

## 2022-04-02 ENCOUNTER — HEALTH MAINTENANCE LETTER (OUTPATIENT)
Age: 37
End: 2022-04-02

## 2022-10-01 ENCOUNTER — HEALTH MAINTENANCE LETTER (OUTPATIENT)
Age: 37
End: 2022-10-01

## 2023-05-13 ENCOUNTER — HEALTH MAINTENANCE LETTER (OUTPATIENT)
Age: 38
End: 2023-05-13

## 2023-08-15 ENCOUNTER — MEDICAL CORRESPONDENCE (OUTPATIENT)
Dept: HEALTH INFORMATION MANAGEMENT | Facility: CLINIC | Age: 38
End: 2023-08-15
Payer: COMMERCIAL

## 2023-08-15 ENCOUNTER — TRANSCRIBE ORDERS (OUTPATIENT)
Dept: MATERNAL FETAL MEDICINE | Facility: HOSPITAL | Age: 38
End: 2023-08-15
Payer: COMMERCIAL

## 2023-08-15 DIAGNOSIS — O26.90 PREGNANCY RELATED CONDITION, ANTEPARTUM: Primary | ICD-10-CM

## 2023-09-25 ENCOUNTER — PRE VISIT (OUTPATIENT)
Dept: MATERNAL FETAL MEDICINE | Facility: CLINIC | Age: 38
End: 2023-09-25
Payer: COMMERCIAL

## 2023-10-02 ENCOUNTER — HOSPITAL ENCOUNTER (OUTPATIENT)
Dept: ULTRASOUND IMAGING | Facility: CLINIC | Age: 38
Discharge: HOME | End: 2023-10-02
Attending: ADVANCED PRACTICE MIDWIFE
Payer: COMMERCIAL

## 2023-10-02 ENCOUNTER — OFFICE VISIT (OUTPATIENT)
Dept: MATERNAL FETAL MEDICINE | Facility: CLINIC | Age: 38
End: 2023-10-02
Attending: ADVANCED PRACTICE MIDWIFE
Payer: COMMERCIAL

## 2023-10-02 DIAGNOSIS — O26.90 PREGNANCY RELATED CONDITION, ANTEPARTUM: ICD-10-CM

## 2023-10-02 DIAGNOSIS — O09.812 PREGNANCY RESULTING FROM IN VITRO FERTILIZATION IN SECOND TRIMESTER: Primary | ICD-10-CM

## 2023-10-02 PROCEDURE — 76811 OB US DETAILED SNGL FETUS: CPT

## 2023-10-02 PROCEDURE — 76811 OB US DETAILED SNGL FETUS: CPT | Mod: 26 | Performed by: OBSTETRICS & GYNECOLOGY

## 2023-10-02 PROCEDURE — 99213 OFFICE O/P EST LOW 20 MIN: CPT | Mod: 25 | Performed by: OBSTETRICS & GYNECOLOGY

## 2023-10-02 NOTE — NURSING NOTE
Patient presents to [facility] for L2 ultrasound.  Reports starting to feel fetal movement, no pain, no contractions, leaking of fluid, or bleeding.   SBAR given to [facility] MD, see their note in Epic.

## 2023-10-02 NOTE — PROGRESS NOTES
Please see full imaging report from ViewPoint program under imaging tab.    Thank-you for referring your patient for a comprehensive ultrasound.    I discussed the findings on today's ultrasound with the patient and her partner. I reviewed the limitations of ultrasound both in detecting aneuploidy and structural abnormalities.  Ultrasound can routinely detect 80-90% of structural abnormalities. She had low risk cell free fetal DNA for genetic screening this pregnancy.     We have recommended a fetal echo, which we will scheduled at 22-23 weeks, due to IVF pregnancy. This will also allow us to reassess the suboptimal heart imaging from today as well.     Due to the combination of IVF and a marginal cord insertion, I do recommend a growth US at 28 weeks and 34 weeks. We also recommend weekly BPPs at 36 weeks. I assume that all of this follow up will be done at [facility], unless other complications arise.     Return to primary provider for continued prenatal care.    If you have questions regarding today's evaluation or if we can be of further service, please contact the [facility].    **Fetal anomalies may be present but not detected**    I spent a total of 20 minutes on the date of this encounter including preparing to see the patient (reviewing medical records/tests), counseling and discussing the plan of care, documenting the visit in the electronic medical record, and communicating with other health care professionals and/or care coordination.    Simona Shaw MD  Maternal Fetal Medicine

## 2023-10-30 ENCOUNTER — HOSPITAL ENCOUNTER (OUTPATIENT)
Dept: ULTRASOUND IMAGING | Facility: CLINIC | Age: 38
Discharge: HOME | End: 2023-10-30
Attending: OBSTETRICS & GYNECOLOGY
Payer: COMMERCIAL

## 2023-10-30 ENCOUNTER — OFFICE VISIT (OUTPATIENT)
Dept: MATERNAL FETAL MEDICINE | Facility: CLINIC | Age: 38
End: 2023-10-30
Attending: OBSTETRICS & GYNECOLOGY
Payer: COMMERCIAL

## 2023-10-30 DIAGNOSIS — O09.812 PREGNANCY RESULTING FROM IN VITRO FERTILIZATION IN SECOND TRIMESTER: Primary | ICD-10-CM

## 2023-10-30 DIAGNOSIS — O09.812 PREGNANCY RESULTING FROM IN VITRO FERTILIZATION IN SECOND TRIMESTER: ICD-10-CM

## 2023-10-30 PROCEDURE — 93325 DOPPLER ECHO COLOR FLOW MAPG: CPT | Mod: 26 | Performed by: OBSTETRICS & GYNECOLOGY

## 2023-10-30 PROCEDURE — 76827 ECHO EXAM OF FETAL HEART: CPT | Mod: 26 | Performed by: OBSTETRICS & GYNECOLOGY

## 2023-10-30 PROCEDURE — 76825 ECHO EXAM OF FETAL HEART: CPT | Mod: 26 | Performed by: OBSTETRICS & GYNECOLOGY

## 2023-10-30 PROCEDURE — 93325 DOPPLER ECHO COLOR FLOW MAPG: CPT

## 2023-10-30 NOTE — NURSING NOTE
Patient presents to [facility] for fetal echo at 22w2d due to IVF, suboptimal heart. Positive fetal movement. Denies LOF, vaginal bleeding or cramping/contractions. SBAR given to [facility] MD, see their note in Epic.

## 2023-10-30 NOTE — PROGRESS NOTES
Please see full imaging report from ViewPoint program under imaging tab.    Simona Shaw MD  Maternal Fetal Medicine

## 2024-01-24 ENCOUNTER — HOSPITAL ENCOUNTER (OUTPATIENT)
Facility: CLINIC | Age: 39
Discharge: HOME | End: 2024-01-25
Attending: ADVANCED PRACTICE MIDWIFE | Admitting: ADVANCED PRACTICE MIDWIFE
Payer: COMMERCIAL

## 2024-01-24 DIAGNOSIS — O16.3 ELEVATED BLOOD PRESSURE AFFECTING PREGNANCY IN THIRD TRIMESTER, ANTEPARTUM: Primary | ICD-10-CM

## 2024-01-24 PROBLEM — Z36.89 ENCOUNTER FOR TRIAGE IN PREGNANT PATIENT: Status: ACTIVE | Noted: 2024-01-24

## 2024-01-24 LAB
ALBUMIN MFR UR ELPH: 19 MG/DL
ALBUMIN SERPL BCG-MCNC: 3.2 G/DL (ref 3.5–5.2)
ALP SERPL-CCNC: 112 U/L (ref 40–150)
ALT SERPL W P-5'-P-CCNC: 11 U/L (ref 0–50)
ANION GAP SERPL CALCULATED.3IONS-SCNC: 11 MMOL/L (ref 7–15)
AST SERPL W P-5'-P-CCNC: 18 U/L (ref 0–45)
BILIRUB SERPL-MCNC: 0.2 MG/DL
BUN SERPL-MCNC: 10.1 MG/DL (ref 6–20)
CALCIUM SERPL-MCNC: 8.5 MG/DL (ref 8.6–10)
CHLORIDE SERPL-SCNC: 107 MMOL/L (ref 98–107)
CREAT SERPL-MCNC: 0.7 MG/DL (ref 0.51–0.95)
CREAT UR-MCNC: 65.6 MG/DL
DEPRECATED HCO3 PLAS-SCNC: 20 MMOL/L (ref 22–29)
EGFRCR SERPLBLD CKD-EPI 2021: >90 ML/MIN/1.73M2
ERYTHROCYTE [DISTWIDTH] IN BLOOD BY AUTOMATED COUNT: 13.8 % (ref 10–15)
GLUCOSE SERPL-MCNC: 130 MG/DL (ref 70–99)
HCT VFR BLD AUTO: 31 % (ref 35–47)
HEMOGLOBIN (EXTERNAL): 13.5 G/DL (ref 12–16)
HEPATITIS B SURFACE ANTIGEN (EXTERNAL): NONREACTIVE
HGB BLD-MCNC: 10.3 G/DL (ref 11.7–15.7)
HIV1+2 AB SERPL QL IA: NONREACTIVE
MCH RBC QN AUTO: 28 PG (ref 26.5–33)
MCHC RBC AUTO-ENTMCNC: 33.2 G/DL (ref 31.5–36.5)
MCV RBC AUTO: 84 FL (ref 78–100)
PLATELET # BLD AUTO: 145 10E3/UL (ref 150–450)
POTASSIUM SERPL-SCNC: 4.2 MMOL/L (ref 3.4–5.3)
PROT SERPL-MCNC: 6.3 G/DL (ref 6.4–8.3)
PROT/CREAT 24H UR: 0.29 MG/MG CR (ref 0–0.2)
RBC # BLD AUTO: 3.68 10E6/UL (ref 3.8–5.2)
RUBELLA ANTIBODY IGG (EXTERNAL): NORMAL
SODIUM SERPL-SCNC: 138 MMOL/L (ref 135–145)
TREPONEMA PALLIDUM ANTIBODY (EXTERNAL): NONREACTIVE
WBC # BLD AUTO: 10.1 10E3/UL (ref 4–11)

## 2024-01-24 PROCEDURE — 250N000013 HC RX MED GY IP 250 OP 250 PS 637: Performed by: ADVANCED PRACTICE MIDWIFE

## 2024-01-24 PROCEDURE — 84156 ASSAY OF PROTEIN URINE: CPT | Performed by: ADVANCED PRACTICE MIDWIFE

## 2024-01-24 PROCEDURE — 80053 COMPREHEN METABOLIC PANEL: CPT | Performed by: ADVANCED PRACTICE MIDWIFE

## 2024-01-24 PROCEDURE — 85027 COMPLETE CBC AUTOMATED: CPT | Performed by: ADVANCED PRACTICE MIDWIFE

## 2024-01-24 PROCEDURE — 36415 COLL VENOUS BLD VENIPUNCTURE: CPT | Performed by: ADVANCED PRACTICE MIDWIFE

## 2024-01-24 RX ORDER — HYDROXYZINE HYDROCHLORIDE 25 MG/1
50 TABLET, FILM COATED ORAL EVERY 6 HOURS PRN
Status: DISCONTINUED | OUTPATIENT
Start: 2024-01-24 | End: 2024-01-25 | Stop reason: HOSPADM

## 2024-01-24 RX ORDER — SODIUM CHLORIDE, SODIUM LACTATE, POTASSIUM CHLORIDE, CALCIUM CHLORIDE 600; 310; 30; 20 MG/100ML; MG/100ML; MG/100ML; MG/100ML
10-125 INJECTION, SOLUTION INTRAVENOUS CONTINUOUS
Status: DISCONTINUED | OUTPATIENT
Start: 2024-01-24 | End: 2024-01-25 | Stop reason: HOSPADM

## 2024-01-24 RX ORDER — LABETALOL 100 MG/1
400 TABLET, FILM COATED ORAL EVERY 12 HOURS SCHEDULED
Status: DISCONTINUED | OUTPATIENT
Start: 2024-01-24 | End: 2024-01-24

## 2024-01-24 RX ORDER — LIDOCAINE 40 MG/G
CREAM TOPICAL
Status: DISCONTINUED | OUTPATIENT
Start: 2024-01-24 | End: 2024-01-25 | Stop reason: HOSPADM

## 2024-01-24 RX ORDER — ASPIRIN 81 MG/1
81 TABLET ORAL DAILY
Status: ON HOLD | COMMUNITY
End: 2024-02-13

## 2024-01-24 RX ORDER — LABETALOL HYDROCHLORIDE 5 MG/ML
20 INJECTION, SOLUTION INTRAVENOUS
Status: DISCONTINUED | OUTPATIENT
Start: 2024-01-24 | End: 2024-01-25 | Stop reason: HOSPADM

## 2024-01-24 RX ORDER — NIFEDIPINE 10 MG/1
10-20 CAPSULE ORAL
Status: DISCONTINUED | OUTPATIENT
Start: 2024-01-24 | End: 2024-01-25 | Stop reason: HOSPADM

## 2024-01-24 RX ORDER — LABETALOL 100 MG/1
400 TABLET, FILM COATED ORAL EVERY 12 HOURS SCHEDULED
Status: DISCONTINUED | OUTPATIENT
Start: 2024-01-24 | End: 2024-01-25 | Stop reason: HOSPADM

## 2024-01-24 RX ADMIN — LABETALOL HYDROCHLORIDE 400 MG: 100 TABLET, FILM COATED ORAL at 15:40

## 2024-01-24 ASSESSMENT — ACTIVITIES OF DAILY LIVING (ADL)
ADLS_ACUITY_SCORE: 18

## 2024-01-24 NOTE — H&P
"HISTORY AND PHYSICAL UPDATE ADMISSION EXAM    Name: Suzette Rivera  YOB: 1985  Medical Record Number: [id number]    History of Present Illness: Suzette Rivera is a 38 year old female who is 34w4d pregnant and being admitted for overnight observation due to new diagnosis of GHTN. 1st elevated BP was at 34.2w she was instructed to begin BP monitoring at home and called clinic today with BP's 150's-160's/110's. She was instructed to present to [facility]. Upon presentation, BP's consistently 140's/90's. Highest 147/102. HELLP panel was collected. Platelets 145. PC ratio 0.29. Consulted with Dr. Barnhart who recommended overnight observation and initiating BID labetalol 400mg. Has already been taking LDASA throughout pregnancy due to risk factors.     Denies HA, RUQ pain, visual changes. Hx severe pre-e at 37w with 1st pregnancy.     Estimated Date of Delivery: Mar 2, 2024    EGA 34w4d    OB History    Para Term  AB Living   2 1 1 0 0 1   SAB IAB Ectopic Multiple Live Births   0 0 0 0 1      # Outcome Date GA Lbr Nicholas/2nd Weight Sex Delivery Anes PTL Lv   2 Current            1 Term 22 37w2d 06:00 / 01:44 2.95 kg (6 lb 8.1 oz) F Vag-Spont IV, Nitrous N GABRIELE      Complications: Fetal Intolerance      Name: HIRA RIVERA-SUZETTE      Apgar1: 4  Apgar5: 6        Lab Results   Component Value Date    AS Negative 2022    HGB 10.3 (L) 2024       Prenatal Complications:  IVF pregnancy  2. Hx severe pre-e at 37w   3. AMA  4. Hx rectocele  5. Hx asthma  6. Marginal cord insertion  7. Suspected LGA infant 2963g at 34.2w  8. Transverse presentation at 34.2w    Exam:      BP (!) 143/96   Pulse 106   Resp 16   Ht 1.727 m (5' 8\")   Wt 95.3 kg (210 lb)   BMI 31.93 kg/m      Fetal heart Rate Category 1  Contractions quiet      EXT:  moves freely  Vaginal exam not indicated  Membranes: intact    Assessment: observation for GHTN     Plan:   Observation orders  Regular diet ok  Initiate PO labetalol " 400mg BID   HTN order set placed  NST Q shift  Vitals Q4H  Repeat labs in morning  Per Dr. Barnhart no betamethasone needed.   OK for Atarax at HS if needed.   RN to consult Dr. Barnhart for any severe range BP readings.   Confirm presentation if delivery were to be needed- has not been vertex.     Prenatal record reviewed.    Dr. Barnhart aware of patient status and remains available for consultation and collaboration as needed.      MICHAEL Bell CNM      1/24/2024   3:16 PM

## 2024-01-24 NOTE — PROGRESS NOTES
Pt arrived to [facility] [place] for evaluation of BP and labs. . 34.4wks. Pt brought to room , placed on EFM. FHT's reactive, category I. Uterus soft, non tender. Pt denies feeling regular ctx's. No ctx's noted per EFM. BP elevated, see flowsheets. Pt denies HA, blurred vision, epigastric pain. Mild swelling noted to LE and hands. Other VSS. Labs collected. Will update provider with lab results and assessments. Pt states understanding.

## 2024-01-25 ENCOUNTER — HOSPITAL ENCOUNTER (OUTPATIENT)
Facility: CLINIC | Age: 39
End: 2024-01-25
Admitting: ADVANCED PRACTICE MIDWIFE
Payer: COMMERCIAL

## 2024-01-25 VITALS
WEIGHT: 210 LBS | DIASTOLIC BLOOD PRESSURE: 61 MMHG | HEIGHT: 68 IN | BODY MASS INDEX: 31.83 KG/M2 | TEMPERATURE: 97.9 F | SYSTOLIC BLOOD PRESSURE: 121 MMHG | HEART RATE: 95 BPM | RESPIRATION RATE: 16 BRPM

## 2024-01-25 LAB
ALBUMIN MFR UR ELPH: 6.7 MG/DL
ALBUMIN SERPL BCG-MCNC: 3.1 G/DL (ref 3.5–5.2)
ALP SERPL-CCNC: 98 U/L (ref 40–150)
ALT SERPL W P-5'-P-CCNC: 11 U/L (ref 0–50)
ANION GAP SERPL CALCULATED.3IONS-SCNC: 7 MMOL/L (ref 7–15)
AST SERPL W P-5'-P-CCNC: 15 U/L (ref 0–45)
BILIRUB SERPL-MCNC: 0.2 MG/DL
BUN SERPL-MCNC: 8 MG/DL (ref 6–20)
CALCIUM SERPL-MCNC: 8.2 MG/DL (ref 8.6–10)
CHLORIDE SERPL-SCNC: 107 MMOL/L (ref 98–107)
CREAT SERPL-MCNC: 0.71 MG/DL (ref 0.51–0.95)
CREAT UR-MCNC: 43.9 MG/DL
DEPRECATED HCO3 PLAS-SCNC: 22 MMOL/L (ref 22–29)
EGFRCR SERPLBLD CKD-EPI 2021: >90 ML/MIN/1.73M2
ERYTHROCYTE [DISTWIDTH] IN BLOOD BY AUTOMATED COUNT: 14 % (ref 10–15)
GLUCOSE SERPL-MCNC: 112 MG/DL (ref 70–99)
HCT VFR BLD AUTO: 28.3 % (ref 35–47)
HGB BLD-MCNC: 9.4 G/DL (ref 11.7–15.7)
MCH RBC QN AUTO: 28.1 PG (ref 26.5–33)
MCHC RBC AUTO-ENTMCNC: 33.2 G/DL (ref 31.5–36.5)
MCV RBC AUTO: 85 FL (ref 78–100)
PLATELET # BLD AUTO: 140 10E3/UL (ref 150–450)
POTASSIUM SERPL-SCNC: 3.6 MMOL/L (ref 3.4–5.3)
PROT SERPL-MCNC: 5.6 G/DL (ref 6.4–8.3)
PROT/CREAT 24H UR: 0.15 MG/MG CR (ref 0–0.2)
RBC # BLD AUTO: 3.34 10E6/UL (ref 3.8–5.2)
SODIUM SERPL-SCNC: 136 MMOL/L (ref 135–145)
WBC # BLD AUTO: 9.2 10E3/UL (ref 4–11)

## 2024-01-25 PROCEDURE — 80053 COMPREHEN METABOLIC PANEL: CPT | Performed by: ADVANCED PRACTICE MIDWIFE

## 2024-01-25 PROCEDURE — 36415 COLL VENOUS BLD VENIPUNCTURE: CPT | Performed by: ADVANCED PRACTICE MIDWIFE

## 2024-01-25 PROCEDURE — 85027 COMPLETE CBC AUTOMATED: CPT | Performed by: ADVANCED PRACTICE MIDWIFE

## 2024-01-25 PROCEDURE — 250N000013 HC RX MED GY IP 250 OP 250 PS 637: Performed by: ADVANCED PRACTICE MIDWIFE

## 2024-01-25 PROCEDURE — 84156 ASSAY OF PROTEIN URINE: CPT | Performed by: ADVANCED PRACTICE MIDWIFE

## 2024-01-25 RX ORDER — LABETALOL 300 MG/1
300 TABLET, FILM COATED ORAL 2 TIMES DAILY
Qty: 60 TABLET | Refills: 1 | Status: SHIPPED | OUTPATIENT
Start: 2024-01-25 | End: 2024-02-14

## 2024-01-25 RX ADMIN — LABETALOL HYDROCHLORIDE 400 MG: 100 TABLET, FILM COATED ORAL at 04:52

## 2024-01-25 ASSESSMENT — ACTIVITIES OF DAILY LIVING (ADL)
ADLS_ACUITY_SCORE: 18

## 2024-01-25 NOTE — PROVIDER NOTIFICATION
Spoke with Dr. Barnhart about 2300 BP WNL. Will continue with vitals q4 and give Labetalol 12 hours from last dose.  EDNA SHAY RN on 1/24/2024 at 11:14 PM

## 2024-01-25 NOTE — DISCHARGE INSTRUCTIONS
Learning About When to Call Your Doctor During Pregnancy (After 20 Weeks)  Overview  It's common to have concerns about what might be a problem when you're pregnant. Most pregnancies don't have any serious problems. But it's still important to know when to call your doctor if you have certain symptoms or signs of labor.  These are general suggestions. Your doctor may give you some more information about when to call.  When to call your doctor (after 20 weeks)  Call 911  anytime you think you may need emergency care. For example, call if:  You have severe vaginal bleeding. This means you are soaking through a pad each hour for 2 or more hours.  You have sudden, severe pain in your belly.  You have chest pain, are short of breath, or cough up blood.  You passed out (lost consciousness).  You have a seizure.  You see or feel the umbilical cord.  You think you are about to deliver your baby and can't make it safely to the hospital or birthing center.  Call your doctor now or seek immediate medical care if:  You have vaginal bleeding.  You have belly pain.  You have a fever.  You are dizzy or lightheaded, or you feel like you may faint.  You have signs of a blood clot in your leg (called a deep vein thrombosis), such as:  Pain in the calf, back of the knee, thigh, or groin.  Swelling in your leg or groin.  A color change on the leg or groin. The skin may be reddish or purplish, depending on your usual skin color.  You have symptoms of preeclampsia, such as:  Sudden swelling of your face, hands, or feet.  New vision problems (such as dimness, blurring, or seeing spots).  A severe headache.  You have a sudden release of fluid from your vagina. (You think your water broke.)  You've been having regular contractions for an hour. This means that you've had at least 6 contractions within 1 hour, even after you change your position and drink fluids.  You notice that your baby has stopped moving or is moving less than  "normal.  You have signs of heart failure, such as:  New or increased shortness of breath.  New or worse swelling in your legs, ankles, or feet.  Sudden weight gain, such as more than 2 to 3 pounds in a day or 5 pounds in a week.  Feeling so tired or weak that you cannot do your usual activities.  You have symptoms of a urinary tract infection. These may include:  Pain or burning when you urinate.  A frequent need to urinate without being able to pass much urine.  Pain in the flank, which is just below the rib cage and above the waist on either side of the back.  Blood in your urine.  Watch closely for changes in your health, and be sure to contact your doctor if:  You have vaginal discharge that smells bad.  You feel sad, anxious, or hopeless for more than a few days.  You have skin changes, such as a rash, itching, or a yellow color to your skin.  You have other concerns about your pregnancy.  If you have labor signs at 37 weeks or more  If you have signs of labor at 37 weeks or more, your doctor may tell you to call when your labor becomes more active. Symptoms of active labor include:  Contractions that are regular.  Contractions that are less than 5 minutes apart.  Contractions that are hard to talk through.  Follow-up care is a key part of your treatment and safety. Be sure to make and go to all appointments, and call your doctor if you are having problems. It's also a good idea to know your test results and keep a list of the medicines you take.  Where can you learn more?  Go to https://www.[employer].net/patiented  Enter N531 in the search box to learn more about \"Learning About When to Call Your Doctor During Pregnancy (After 20 Weeks).\"  Current as of: July 11, 2023               Content Version: 13.8    2020-6413 [employer].   Care instructions adapted under license by your healthcare professional. If you have questions about a medical condition or this instruction, always ask your healthcare " professional. [employer] disclaims any warranty or liability for your use of this information.

## 2024-01-25 NOTE — DISCHARGE SUMMARY
OB Discharge Summary      Date:  2024    Name:  Suzette Rivera  :  1985  MRN:  [id number]      Admission Date:  2024  Delivery Date: This patient has no babies on file.  Gestational Age:  34w5d  Discharge Date:  2024    Principal Diagnosis:    Patient Active Problem List   Diagnosis    Elevated blood pressure affecting pregnancy in third trimester, antepartum    Severe pre-eclampsia in third trimester    Pregnancy resulting from in vitro fertilization in third trimester    Encounter for induction of labor    Encounter for triage in pregnant patient         Conditions complicating Pregnancy: Patient was admitted for elevated BP GHTN with history     Procedure(s) Performed:  NST and monitoring BP         Condition at Discharge:  stable BP well controlled.  Pt feeling foggy after labetalol, will lower dose to 300mg BID.     Discharge Medications:      Review of your medicines        UNREVIEWED medicines. Ask your doctor about these medicines        Dose / Directions   aspirin 81 MG EC tablet      Dose: 81 mg  Take 81 mg by mouth daily  Refills: 0     prenatal multivitamin  plus iron 27-1 MG Tabs      Take by mouth daily  Refills: 0               Discharge Plan:    Follow up with [facility] next week with BPP and visit  pt will check BP at home   Patient Instructions:      Physical activity: modified    Diet:  regular    Medication: 300mg labetalol    Other:        Physician/CNM: Viki Benitez MD    Name:  Suzette Rivera  :  1985  MRN:  [id number]

## 2024-01-25 NOTE — PROVIDER NOTIFICATION
Spoke with Dr. Barnhart about holding 2100 Labetalol with pt's BP at 122/72. Will hold for now and repeat BP in 2 hours.  EDNA SHAY RN on 1/24/2024 at 9:22 PM

## 2024-02-10 PROBLEM — Z34.90 PREGNANCY: Status: ACTIVE | Noted: 2024-02-10

## 2024-02-11 ENCOUNTER — ANESTHESIA (OUTPATIENT)
Dept: OBGYN | Facility: CLINIC | Age: 39
End: 2024-02-11
Payer: COMMERCIAL

## 2024-02-11 ENCOUNTER — ANESTHESIA EVENT (OUTPATIENT)
Dept: OBGYN | Facility: CLINIC | Age: 39
End: 2024-02-11
Payer: COMMERCIAL

## 2024-02-11 PROCEDURE — 370N000003 HC ANESTHESIA WARD SERVICE: Performed by: ANESTHESIOLOGY

## 2024-02-11 PROCEDURE — 250N000011 HC RX IP 250 OP 636: Performed by: ANESTHESIOLOGY

## 2024-02-11 RX ORDER — BUPIVACAINE HYDROCHLORIDE 2.5 MG/ML
INJECTION, SOLUTION EPIDURAL; INFILTRATION; INTRACAUDAL
Status: COMPLETED | OUTPATIENT
Start: 2024-02-11 | End: 2024-02-11

## 2024-02-11 RX ADMIN — BUPIVACAINE HYDROCHLORIDE 6 ML: 2.5 INJECTION, SOLUTION EPIDURAL; INFILTRATION; INTRACAUDAL at 06:38

## 2024-02-11 NOTE — ANESTHESIA PROCEDURE NOTES
Epidural catheter Procedure Note    Pre-Procedure   Staff -        Anesthesiologist:  Jayda Linton MD       Performed By: anesthesiologist       Location: OB       Procedure Start/Stop Times: 2/11/2024 6:18 AM and 2/11/2024 6:38 AM       Pre-Anesthestic Checklist: patient identified, IV checked, risks and benefits discussed, informed consent, monitors and equipment checked, pre-op evaluation, at physician/surgeon's request and post-op pain management  Timeout:       Correct Patient: Yes        Correct Procedure: Yes        Correct Site: Yes        Correct Position: Yes   Procedure Documentation  Procedure: epidural catheter       Patient Position: sitting       Skin prep: Chloraprep       Local skin infiltrated with mL of 1% lidocaine.        Insertion Site: L2-3. (midline approach).       Technique: LORT saline        YAHIR at 7 cm.       Needle Type: Epocratesy needle       Needle Gauge: 18.        Needle Length (Inches): 3.5        Catheter: 20 G.          Catheter threaded easily.         4 cm epidural space.         Threaded 11 cm at skin.         # of attempts: 1 and  # of redirects:  0    Assessment/Narrative         Paresthesias: No.       Test dose of 3 mL lidocaine 1.5% w/ 1:200,000 epinephrine at 06:31 CST.         Test dose negative, 3 minutes after injection, for signs of intravascular, subdural, or intrathecal injection.       Insertion/Infusion Method: LORT saline       Aspiration negative for Heme or CSF via Epidural Catheter.    Medication(s) Administered   0.25% Bupivacaine PF (Epidural) - EPIDURAL   6 mL - 2/11/2024 6:38:00 AM  Medication Administration Time: 2/11/2024 6:18 AM     Comments:  R/B/A of neuraxial discussed including but not limited to infection, bleeding, headache, nerve damage, and failed block. All questions answered and pt wished to proceed. [place] YAHIR On first pass, catheter threaded easily. No paresthesias during placement or with injection of medication. Pt tolerated procedure well  "with no immediate complications.         FOR [facility] ([facility]) ONLY:   Pain Team Contact information: please page the Pain Team Via [employer]. Search \"Pain\". During daytime hours, please page the attending first. At night please page the resident first.      "

## 2024-02-11 NOTE — ANESTHESIA PREPROCEDURE EVALUATION
Anesthesia Pre-Procedure Evaluation    Patient: Suzette Rivera   MRN: [id number] : 1985        Procedure : * No procedures listed *  Induction       Past Medical History:   Diagnosis Date    Hypertension       Past Surgical History:   Procedure Laterality Date    ADENOIDECTOMY Bilateral 2018    Procedure: ADENOIDECTOMY;  Surgeon: Kobi Martinez MD;  Location: [facility];  Service: ENT    ETHMOIDECTOMY Bilateral 2018    Procedure: PARTIAL ETHMOIDECTOMY, BILATERAL ANTROSTOMY WITH CURETTAGE;  Surgeon: Kobi Martinez MD;  Location: [facility];  Service: ENT    HALLUX VALGUS CORRECTION Right     REFRACTIVE SURGERY        Allergies   Allergen Reactions    Adhesive Tape-Silicones [Adhesive Tape] Other (See Comments)     Steri Strips    Amoxicillin-Pot Clavulanate Rash      Social History     Tobacco Use    Smoking status: Never    Smokeless tobacco: Never   Substance Use Topics    Alcohol use: Not Currently      Wt Readings from Last 1 Encounters:   24 95.3 kg (210 lb)        Anesthesia Evaluation   Pt has had prior anesthetic. Type: OB Labor Epidural.    No history of anesthetic complications       ROS/MED HX  ENT/Pulmonary:    (-) asthma   Neurologic:       Cardiovascular: Comment: Chronic HTN     (+)  - - PIH and BP Meds  -  - -                                      METS/Exercise Tolerance:     Hematologic:     (+)    no thrombocytopenia,            Musculoskeletal:   (+)         lumbar spine      GI/Hepatic:       Renal/Genitourinary:       Endo:  - neg endo ROS     Psychiatric/Substance Use:  - neg psychiatric ROS     Infectious Disease:       Malignancy:       Other:            Physical Exam    Airway        Mallampati: II   TM distance: > 3 FB   Neck ROM: full   Mouth opening: > 3 cm    Respiratory Devices and Support         Dental  no notable dental history         Cardiovascular   cardiovascular exam normal          Pulmonary   pulmonary exam normal                 OUTSIDE LABS:  CBC:   Lab Results   Component Value Date    WBC 7.6 02/10/2024    WBC 9.2 01/25/2024    HGB 10.7 (L) 02/10/2024    HGB 9.4 (L) 01/25/2024    HCT 32.0 (L) 02/10/2024    HCT 28.3 (L) 01/25/2024     02/10/2024     (L) 01/25/2024     BMP:   Lab Results   Component Value Date     02/10/2024     01/25/2024    POTASSIUM 4.4 02/10/2024    POTASSIUM 3.6 01/25/2024    CHLORIDE 105 02/10/2024    CHLORIDE 107 01/25/2024    CO2 21 (L) 02/10/2024    CO2 22 01/25/2024    BUN 9.2 02/10/2024    BUN 8.0 01/25/2024    CR 0.88 02/10/2024    CR 0.71 01/25/2024    GLC 91 02/10/2024     (H) 01/25/2024     COAGS:   Lab Results   Component Value Date    PTT 28 02/10/2024    INR 1.01 02/10/2024     POC:   Lab Results   Component Value Date    HCG Negative 12/28/2018     HEPATIC:   Lab Results   Component Value Date    ALBUMIN 3.4 (L) 02/10/2024    PROTTOTAL 6.2 (L) 02/10/2024    ALT 14 02/10/2024    AST 21 02/10/2024    ALKPHOS 119 02/10/2024    BILITOTAL 0.2 02/10/2024     OTHER:   Lab Results   Component Value Date    SUMIT 9.2 02/10/2024    MAG 5.7 (HH) 01/27/2022       Anesthesia Plan    ASA Status:  3       Anesthesia Type: Epidural.              Consents    Anesthesia Plan(s) and associated risks, benefits, and realistic alternatives discussed. Questions answered and patient/representative(s) expressed understanding.     - Discussed:     - Discussed with:  Patient, Spouse            Postoperative Care            Comments:               Jayda Linton MD    I have reviewed the pertinent notes and labs in the chart from the past 30 days and (re)examined the patient.  Any updates or changes from those notes are reflected in this note.          # Hypoalbuminemia: Lowest albumin = 3.4 g/dL at 2/10/2024  8:12 AM, will monitor as appropriate    # Drug Induced Platelet Defect: home medication list includes an antiplatelet medication

## 2024-02-12 NOTE — ANESTHESIA POSTPROCEDURE EVALUATION
Patient: Suzette Rivera    Procedure: * No procedures listed *  Induction    Anesthesia Type:  Epidural    Note:  Disposition: Inpatient   Postop Pain Control: Uneventful            Sign Out: Well controlled pain   PONV: No   Neuro/Psych: Uneventful            Sign Out: Acceptable/Baseline neuro status   Airway/Respiratory: Uneventful            Sign Out: Acceptable/Baseline resp. status   CV/Hemodynamics: Uneventful            Sign Out: Acceptable CV status; No obvious hypovolemia; No obvious fluid overload   Other NRE: NONE   DID A NON-ROUTINE EVENT OCCUR? No    Event details/Postop Comments:  Denies headache, neuro issues. Walking around without issue or concerns. All questions answered.              Last vitals:  Vitals:    02/12/24 0000 02/12/24 0400 02/12/24 0930   BP: (!) 144/73 132/73 (!) 144/84   Pulse:   75   Resp: 16 16 16   Temp: 36.9  C (98.4  F) 36.4  C (97.6  F) 36.5  C (97.7  F)   SpO2: 95%         Electronically Signed By: Krishna Reynoso MD  February 12, 2024  12:39 PM

## 2024-02-13 PROBLEM — O13.9 GESTATIONAL HYPERTENSION: Status: ACTIVE | Noted: 2024-02-13

## 2024-02-14 ENCOUNTER — HOSPITAL ENCOUNTER (EMERGENCY)
Facility: CLINIC | Age: 39
Discharge: HOME | End: 2024-02-14
Attending: EMERGENCY MEDICINE | Admitting: EMERGENCY MEDICINE
Payer: COMMERCIAL

## 2024-02-14 VITALS
HEIGHT: 68 IN | OXYGEN SATURATION: 97 % | TEMPERATURE: 97.9 F | SYSTOLIC BLOOD PRESSURE: 141 MMHG | HEART RATE: 87 BPM | WEIGHT: 212 LBS | DIASTOLIC BLOOD PRESSURE: 86 MMHG | RESPIRATION RATE: 17 BRPM | BODY MASS INDEX: 32.13 KG/M2

## 2024-02-14 DIAGNOSIS — O16.3 ELEVATED BLOOD PRESSURE AFFECTING PREGNANCY IN THIRD TRIMESTER, ANTEPARTUM: ICD-10-CM

## 2024-02-14 LAB
ALBUMIN SERPL BCG-MCNC: 3.2 G/DL (ref 3.5–5.2)
ALBUMIN UR-MCNC: NEGATIVE MG/DL
ALP SERPL-CCNC: 98 U/L (ref 40–150)
ALT SERPL W P-5'-P-CCNC: 43 U/L (ref 0–50)
ANION GAP SERPL CALCULATED.3IONS-SCNC: 8 MMOL/L (ref 7–15)
APPEARANCE UR: CLEAR
APTT PPP: 26 SECONDS (ref 22–38)
AST SERPL W P-5'-P-CCNC: 59 U/L (ref 0–45)
BILIRUB DIRECT SERPL-MCNC: <0.2 MG/DL (ref 0–0.3)
BILIRUB SERPL-MCNC: <0.2 MG/DL
BILIRUB UR QL STRIP: NEGATIVE
BUN SERPL-MCNC: 12.8 MG/DL (ref 6–20)
CALCIUM SERPL-MCNC: 9.3 MG/DL (ref 8.6–10)
CHLORIDE SERPL-SCNC: 108 MMOL/L (ref 98–107)
COLOR UR AUTO: COLORLESS
CREAT SERPL-MCNC: 0.83 MG/DL (ref 0.51–0.95)
DEPRECATED HCO3 PLAS-SCNC: 21 MMOL/L (ref 22–29)
EGFRCR SERPLBLD CKD-EPI 2021: >90 ML/MIN/1.73M2
ERYTHROCYTE [DISTWIDTH] IN BLOOD BY AUTOMATED COUNT: 16.7 % (ref 10–15)
GLUCOSE SERPL-MCNC: 91 MG/DL (ref 70–99)
GLUCOSE UR STRIP-MCNC: NEGATIVE MG/DL
HCT VFR BLD AUTO: 28.9 % (ref 35–47)
HGB BLD-MCNC: 9.7 G/DL (ref 11.7–15.7)
HGB UR QL STRIP: ABNORMAL
INR PPP: 0.93 (ref 0.85–1.15)
KETONES UR STRIP-MCNC: NEGATIVE MG/DL
LEUKOCYTE ESTERASE UR QL STRIP: ABNORMAL
MCH RBC QN AUTO: 28.5 PG (ref 26.5–33)
MCHC RBC AUTO-ENTMCNC: 33.6 G/DL (ref 31.5–36.5)
MCV RBC AUTO: 85 FL (ref 78–100)
MUCOUS THREADS #/AREA URNS LPF: PRESENT /LPF
NITRATE UR QL: NEGATIVE
PH UR STRIP: 6 [PH] (ref 5–7)
PLATELET # BLD AUTO: 154 10E3/UL (ref 150–450)
POTASSIUM SERPL-SCNC: 4.1 MMOL/L (ref 3.4–5.3)
PROT SERPL-MCNC: 5.9 G/DL (ref 6.4–8.3)
RBC # BLD AUTO: 3.4 10E6/UL (ref 3.8–5.2)
RBC URINE: 11 /HPF
SODIUM SERPL-SCNC: 137 MMOL/L (ref 135–145)
SP GR UR STRIP: 1.01 (ref 1–1.03)
SQUAMOUS EPITHELIAL: <1 /HPF
UROBILINOGEN UR STRIP-MCNC: <2 MG/DL
WBC # BLD AUTO: 12.1 10E3/UL (ref 4–11)
WBC URINE: 15 /HPF

## 2024-02-14 PROCEDURE — 99291 CRITICAL CARE FIRST HOUR: CPT | Mod: 25

## 2024-02-14 PROCEDURE — 85027 COMPLETE CBC AUTOMATED: CPT

## 2024-02-14 PROCEDURE — 81003 URINALYSIS AUTO W/O SCOPE: CPT

## 2024-02-14 PROCEDURE — 80053 COMPREHEN METABOLIC PANEL: CPT

## 2024-02-14 PROCEDURE — 36415 COLL VENOUS BLD VENIPUNCTURE: CPT

## 2024-02-14 PROCEDURE — 87086 URINE CULTURE/COLONY COUNT: CPT

## 2024-02-14 PROCEDURE — 250N000011 HC RX IP 250 OP 636

## 2024-02-14 PROCEDURE — 85730 THROMBOPLASTIN TIME PARTIAL: CPT

## 2024-02-14 PROCEDURE — 85610 PROTHROMBIN TIME: CPT

## 2024-02-14 PROCEDURE — 250N000013 HC RX MED GY IP 250 OP 250 PS 637

## 2024-02-14 PROCEDURE — 96374 THER/PROPH/DIAG INJ IV PUSH: CPT

## 2024-02-14 PROCEDURE — 99284 EMERGENCY DEPT VISIT MOD MDM: CPT | Mod: 25

## 2024-02-14 RX ORDER — LABETALOL 300 MG/1
300 TABLET, FILM COATED ORAL 3 TIMES DAILY
Qty: 90 TABLET | Refills: 0 | Status: SHIPPED | OUTPATIENT
Start: 2024-02-14

## 2024-02-14 RX ORDER — LABETALOL HYDROCHLORIDE 5 MG/ML
10 INJECTION, SOLUTION INTRAVENOUS ONCE
Status: COMPLETED | OUTPATIENT
Start: 2024-02-14 | End: 2024-02-14

## 2024-02-14 RX ORDER — LABETALOL 100 MG/1
300 TABLET, FILM COATED ORAL ONCE
Status: COMPLETED | OUTPATIENT
Start: 2024-02-14 | End: 2024-02-14

## 2024-02-14 RX ORDER — NIFEDIPINE 30 MG
30 TABLET, EXTENDED RELEASE ORAL ONCE
Status: COMPLETED | OUTPATIENT
Start: 2024-02-14 | End: 2024-02-14

## 2024-02-14 RX ORDER — NIFEDIPINE 30 MG
30 TABLET, EXTENDED RELEASE ORAL EVERY MORNING
Qty: 30 TABLET | Refills: 0 | Status: SHIPPED | OUTPATIENT
Start: 2024-02-14

## 2024-02-14 RX ADMIN — LABETALOL HYDROCHLORIDE 300 MG: 100 TABLET, FILM COATED ORAL at 19:23

## 2024-02-14 RX ADMIN — NIFEDIPINE 30 MG: 30 TABLET, EXTENDED RELEASE ORAL at 19:22

## 2024-02-14 RX ADMIN — LABETALOL HYDROCHLORIDE 10 MG: 5 INJECTION, SOLUTION INTRAVENOUS at 19:24

## 2024-02-14 ASSESSMENT — ENCOUNTER SYMPTOMS
SHORTNESS OF BREATH: 0
CONFUSION: 0
DIZZINESS: 0
NUMBNESS: 0
VOMITING: 0
COUGH: 0
LIGHT-HEADEDNESS: 0
WEAKNESS: 0
NAUSEA: 0
HEADACHES: 0
FEVER: 0
DIARRHEA: 0
ABDOMINAL PAIN: 0
WOUND: 0

## 2024-02-14 ASSESSMENT — ACTIVITIES OF DAILY LIVING (ADL)
ADLS_ACUITY_SCORE: 35
ADLS_ACUITY_SCORE: 35

## 2024-02-14 NOTE — ED PROVIDER NOTES
Emergency Department Encounter  [facility] EMERGENCY ROOM  Suzette Rivera   AGE: 38 year old SEX: female  YOB: 1985  MRN: [id number]      EVALUATION DATE & TIME: No admission date for patient encounter. ; PCP: Dora Riley   ED PROVIDER: Isaura Syed PA-C    CHIEF COMPLAINT: Hypertension      MEDICAL DECISION MAKING   Suzette Rivera is a 38 year old female s/p and  3 days ago (2024) with a pertinent history of severe preeclampsia in third trimester, asthma, and gestational hypertension who presents to the ED for evaluation of high blood pressure.  Patient notes elevated blood pressure readings in the 140-150/80-90 range throughout the day today.  She reached out to her OB who advised her to add another labetalol dose to the day.  BPs continue to be elevated, patient was advised to come to the ED for evaluation.  She denies headache, visual disturbances, shortness of breath, chest pain, or right upper quadrant abdominal pain.    Vitals reviewed and hemodynamically stable, initially hypertensive in triage at 183/95.  Heart regular in rhythm and rate.  Lungs clear to auscultation bilaterally.  No abdominal tenderness.    Presentation consistent with postpartum hypertension.  Given patient is less than 4 weeks postpartum, my major concern was with preeclampsia and HELLP syndrome.  Workup was reassuring with normal platelets (154), liver enzymes, creatinine (0.83), and no proteinuria.  Other laboratory work also unremarkable.  Urinalysis was significant for WBC (15), RBC (11) and leukocyte esterace but patient is without dysuria and increased urinary urgency, will not treat as UTI but will contact if culture grows. Low suspicion for acute target organ from a high blood pressure including acute stroke, cardiac ischemia, renal injury, pulmonary edema/CHF, encephalopathy (no AMS).     MD Azam from [facility] recommended labetalol 300 mg oral, nifedipine  extended release 30 mg oral, and labetalol 10 mg IV to control pressures in the ED.  Fortunately, blood pressures down trended in the 2 hours following administration of these medications.     Patient remained hemodynamically stable and asymptomatic throughout ED course.  OB/GYN requested we discharge patient home with increased BP management including labetalol 300 mg 3 times daily and nifedipine ER 30 mg daily.  Rx sent to pharmacy. Patient was provided with clear, written instructions of potential danger signs and where and when to return for emergency care or re-evaluation.     Medical Decision Making  Obtained supplemental history:Supplemental history obtained?: No  Reviewed external records: External records reviewed?: Documented in chart  Care impacted by chronic illness:Hypertension and Other: asthma  Care significantly affected by social determinants of health:N/A  Did you consider but not order tests?: Work up considered but not performed and documented in chart, if applicable  Did you interpret images independently?: Independent interpretation of ECG and images noted in documentation, when applicable.  Consultation discussion with other provider:Did you involve another provider (consultant, MH, pharmacy, etc.)?: I discussed the care with another health care provider, see documentation for details.    FINAL IMPRESSION       ICD-10-CM    1. Postpartum hypertension  O16.5       2. Elevated blood pressure affecting pregnancy in third trimester, antepartum  O16.3         ED COURSE   5:47 PM I met and introduced myself to the patient. I gathered initial history and performed my physical exam. We discussed plan for initial workup.   6:32 PM I have staffed the patient with Dr. Zapata, ED MD, who has evaluated the patient and agrees with all aspects of today's care.   6:51 PM I spoke with OB/GYN (Savana Bear MD) recommends that we start 300 mg labetalol oral, 10 mg labetalol IV, and 30 mg nifedipine extended  release.  7:15 PM  MD Chema call department back after realizing her team did not to care for patient throughout pregnancy.  She was followed by [facility].  Consult placed.  Blood pressure has improved 148/88 with medication so far.  7:45 PM I spoke with [facility] (MD Azam) who recommends we continue with current course of medications.  MD requests I call her o'clock in 2 hours with update on blood pressures.  9:59 PM I placed consult with [facility] about improved BPs, most recent 135/78.  10:45 PM I rechecked the patient and discussed results, discharge, follow up, and reasons to return to the ED.     MEDICATIONS GIVEN IN THE EMERGENCY DEPARTMENT:   Medications   labetalol (NORMODYNE) tablet 300 mg (300 mg Oral $Given 24)   NIFEdipine ER (ADALAT CC) 24 hr tablet 30 mg (30 mg Oral $Given 24)   labetalol (NORMODYNE/TRANDATE) injection 10 mg (10 mg Intravenous $Given 24)      NEW PRESCRIPTIONS STARTED AT TODAY'S ED VISIT:  Discharge Medication List as of 2024 10:38 PM        START taking these medications    Details   NIFEdipine ER (ADALAT CC) 30 MG 24 hr tablet Take 1 tablet (30 mg) by mouth every morning, Disp-30 tablet, R-0, E-Prescribe                 =================================================================         HISTORY OF PRESENT ILLNESS   Patient information was obtained from: Patient    Use of Intrepreter: N/A     Suzette Rivera is a 38 year old female s/p and  3 days ago (2024) with a pertinent history of severe preeclampsia in third trimester, asthma, and gestational hypertension who presents to the ED by private vehicle for evaluation of high blood pressure.     Per chart review,  02/10/2024 to 2024 -   with single interuterine gestation admitted to this hospital for severe preeclampsia in third trimester.  Patient had normal spontaneous vaginal birth at 37 weeks 1 day. patient discharged home with labetalol  300 mg twice daily and was instructed to increase dose to 300 mg 3 times daily.    Patient delivered her second child 3 days ago (2/11/2023). During her first delivery with her first child, she notes that her blood pressure was also high.     The patient forgot to take her labetalol dosage yesterday (2/13/2023). At 6 AM today, she took 1 dose of her labetalol. Before she took this first dose, her blood pressure was 143/82. At 9 AM today (3 hours after her first dose), her blood pressure was 141/85. At 9:15 AM, she rechecked her blood pressure to be 141/85. After this check at 9:15 AM, she called her OB who recommended her to come into the ED. Patient has taken a total of 2 doses of labetalol today. She noted additional blood pressures taken today listed below:    3:30 PM: 149/94  4:30 PM: 148/85  5 PM: 154/105    She denies abdominal pain, headache, vision changes, shortness of breath or chest pain. Patient has no other complaints at this time.      MEDICAL HISTORY     Past Medical History:   Diagnosis Date    Hypertension        Past Surgical History:   Procedure Laterality Date    ADENOIDECTOMY Bilateral 12/28/2018    Procedure: ADENOIDECTOMY;  Surgeon: Kobi Martinez MD;  Location: [facility];  Service: ENT    ETHMOIDECTOMY Bilateral 12/28/2018    Procedure: PARTIAL ETHMOIDECTOMY, BILATERAL ANTROSTOMY WITH CURETTAGE;  Surgeon: Kobi Martinez MD;  Location: [facility];  Service: ENT    HALLUX VALGUS CORRECTION Right     REFRACTIVE SURGERY         Family History   Problem Relation Age of Onset    Hypertension Mother     Osteoarthritis Mother     Hypertension Father     Asthma Father     Asthma Brother     Seizure Disorder Maternal Grandmother     Thyroid Disease Maternal Grandmother     Diabetes Paternal Grandfather        Social History     Tobacco Use    Smoking status: Never    Smokeless tobacco: Never   Substance Use Topics    Alcohol use: Not Currently    Drug use: No  "      acetaminophen (TYLENOL) 325 MG tablet  docusate sodium (COLACE) 100 MG capsule  ibuprofen (ADVIL/MOTRIN) 800 MG tablet  labetalol (NORMODYNE) 300 MG tablet  NIFEdipine ER (ADALAT CC) 30 MG 24 hr tablet  Prenatal Vit-Fe Fumarate-FA (PRENATAL MULTIVITAMIN  PLUS IRON) 27-1 MG TABS        PHYSICAL EXAM   VITALS:  Patient Vitals for the past 24 hrs:   BP Temp Temp src Pulse Resp SpO2 Height Weight   02/14/24 2230 (!) 141/86 -- -- 87 17 97 % -- --   02/14/24 2215 (!) 140/85 -- -- 88 17 95 % -- --   02/14/24 2200 138/83 -- -- 83 17 95 % -- --   02/14/24 2145 135/78 -- -- 88 17 95 % -- --   02/14/24 2130 135/78 -- -- 93 17 96 % -- --   02/14/24 2115 (!) 149/82 -- -- 88 17 98 % -- --   02/14/24 2100 (!) 155/90 -- -- 88 17 98 % -- --   02/14/24 2045 (!) 146/82 -- -- 86 -- 97 % -- --   02/14/24 2030 (!) 156/87 -- -- 86 17 98 % -- --   02/14/24 2015 (!) 158/90 -- -- 85 17 98 % -- --   02/14/24 2000 (!) 159/91 -- -- 90 17 100 % -- --   02/14/24 1945 (!) 153/85 -- -- 86 17 99 % -- --   02/14/24 1930 (!) 157/90 -- -- 86 17 99 % -- --   02/14/24 1923 (!) 156/94 -- -- 92 -- 99 % -- --   02/14/24 1915 (!) 156/94 -- -- 85 -- 100 % -- --   02/14/24 1900 (!) 148/88 -- -- 90 17 99 % -- --   02/14/24 1845 (!) 171/97 -- -- 88 17 98 % -- --   02/14/24 1808 (!) 178/103 -- -- 89 -- -- -- --   02/14/24 1748 (!) 183/95 97.9  F (36.6  C) Temporal 93 16 99 % 1.727 m (5' 8\") 96.2 kg (212 lb)     CONSITUTIONAL: Generally well-appearing female , in no acute distress. Well developed, nourished.  EYES: Conjunctivae clear, no discharge. EOM grossly intact.  HENT: Normocephalic, atraumatic, mucous membranes moist, nose normal.  NECK: Supple, gross ROM intact.   RESPIRATORY: Normal work of breathing, normal rate, speaks in full sentences.  CARDIO: Normal heart rate.  No pedal edema.  GI: Nondistended.   MSK: Moving all 4 extremities intentionally and without pain.  NEURO:  AxOx4. CN II-XII grossly intact, but not individually tested. No focal " neurological deficits.   PSYCH: Thoughts linear and responses appropriate. Cooperative. Appropriate mood and affect.     LABS AND IMAGING   All pertinent labs reviewed and interpreted  Labs Ordered and Resulted from Time of ED Arrival to Time of ED Departure   CBC WITH PLATELETS - Abnormal       Result Value    WBC Count 12.1 (*)     RBC Count 3.40 (*)     Hemoglobin 9.7 (*)     Hematocrit 28.9 (*)     MCV 85      MCH 28.5      MCHC 33.6      RDW 16.7 (*)     Platelet Count 154     BASIC METABOLIC PANEL - Abnormal    Sodium 137      Potassium 4.1      Chloride 108 (*)     Carbon Dioxide (CO2) 21 (*)     Anion Gap 8      Urea Nitrogen 12.8      Creatinine 0.83      GFR Estimate >90      Calcium 9.3      Glucose 91     HEPATIC FUNCTION PANEL - Abnormal    Protein Total 5.9 (*)     Albumin 3.2 (*)     Bilirubin Total <0.2      Alkaline Phosphatase 98      AST 59 (*)     ALT 43      Bilirubin Direct <0.20     ROUTINE UA WITH MICROSCOPIC REFLEX TO CULTURE - Abnormal    Color Urine Colorless      Appearance Urine Clear      Glucose Urine Negative      Bilirubin Urine Negative      Ketones Urine Negative      Specific Gravity Urine 1.011      Blood Urine >1.0 mg/dL (*)     pH Urine 6.0      Protein Albumin Urine Negative      Urobilinogen Urine <2.0      Nitrite Urine Negative      Leukocyte Esterase Urine 250 Laura/uL (*)     Mucus Urine Present (*)     RBC Urine 11 (*)     WBC Urine 15 (*)     Squamous Epithelials Urine <1     INR - Normal    INR 0.93     PARTIAL THROMBOPLASTIN TIME - Normal    aPTT 26     URINE CULTURE       No orders to display       CRITICAL CARE TIME   Performed by: Isaura Syed PA-C  Authorized by: Dr Roopa Zapata  Total critical care time: 30 minutes  Critical care was necessary to treat or prevent imminent or life-threatening deterioration of the following conditions: hypertension requiring IV medications  Critical care was time spent personally by me on the following activities:  development of treatment plan with patient or surrogate, discussions with consultants, examination of patient, evaluation of patient's response to treatment, obtaining history from patient or surrogate, ordering and performing treatments and interventions, ordering and review of laboratory studies, ordering and review of radiographic studies, re-evaluation of patient's condition and monitoring for potential decompensation.  Critical care time was exclusive of separately billable procedures and treating other patients.      I, Francisco Peterson, am serving as a scribe to document services personally performed by Isaura Syed PA-C, based on my observation and the provider's statements to me. I, Isaura Syed PA-C attest that Francisco Peterson is acting in a scribe capacity, has observed my performance of the services and has documented them in accordance with my direction.     Isaura Syed PA-C   Emergency Medicine   [facility] EMERGENCY ROOM  [address] [address]  [phone]  Dept: [phone]      Isaura Syed PA-C  02/14/24 [id number]

## 2024-02-14 NOTE — ED TRIAGE NOTES
The patient presents to the ED with high blood pressure after delivering a baby on Sunday. The baby was born vaginally, no complications. The patient had hypertension during her pregnancy and is taking labetalol 300 mg x2 daily. Her provider instructed her to increase dosage to 300 mg 3 times daily. The patient had several blood pressures that were high at home, the last being 154/105. Denies headache, blurred vision. Does report bilateral lower extremity swelling as well as face puffiness.

## 2024-02-15 LAB — BACTERIA UR CULT: NORMAL

## 2024-02-15 NOTE — PROGRESS NOTES
Called by the ER.  Suzette delivered vaginally 3 days ago.  Induced for GHTN.  Sent home on 300mg twice a day  of labetalol per discharge note.  Sounds like currently she is on 300mg tid.  Took 300mg labetalol this morning and again at noon today.  Pressures at home run 151/92 and 149/94.  Pressures elevated in clinic today.  No preeclamptic symptoms and blood and urine are negative (no protein in the urine, plateles and LFTs normal, creatinine normal).  Pressures in /95, 178/103 and 171/97.  Advised to give 10m IV labetalol, 300mg oral labetalol and 30mg XR nifedipine as well and call me back in 1-2 hours with blood pressure results.  Discussed she will likely need to go home with 30mg oral nifedipine to start in the morning daily orally.  JESÚS Bear MD     After further review, our group delivered Suzette 3 days ago for [facility] and Suzette obtained her OB care with that group.  I called the ER back and asked them to consult [facility] for further management.    JESÚS Bear MD

## 2024-02-15 NOTE — PROGRESS NOTES
Pharmacist Admission Medication History    Admission medication history is complete. The information provided in this note is only as accurate as the sources available at the time of the update.    Information Source(s): Patient and CareEverywhere/SureScripts via in-person    Pertinent Information: Took AM dose of labetalol this morning and was told to take additional dose before coming to [facility].     Changes made to PTA medication list:  Added: None  Deleted: None  Changed: None    Allergies reviewed with patient and updates made in EHR: yes    Medication History Completed By: Yamileth Benson RP 2/14/2024 9:22 PM    PTA Med List   Medication Sig Last Dose    acetaminophen (TYLENOL) 325 MG tablet Take 2 tablets (650 mg) by mouth every 4 hours as needed for mild pain or fever (greater than or equal to 38  C /100.4  F (oral) or 38.5  C/ 101.4  F (core).) 2/14/2024    docusate sodium (COLACE) 100 MG capsule Take 1 capsule (100 mg) by mouth 2 times daily 2/14/2024 at am    ibuprofen (ADVIL/MOTRIN) 800 MG tablet Take 1 tablet (800 mg) by mouth every 6 hours as needed for other (cramping) 2/14/2024    labetalol (NORMODYNE) 300 MG tablet Take 1 tablet (300 mg) by mouth 2 times daily 2/14/2024 at x2    Prenatal Vit-Fe Fumarate-FA (PRENATAL MULTIVITAMIN  PLUS IRON) 27-1 MG TABS Take 1 tablet by mouth daily Past Week

## 2024-02-15 NOTE — PROGRESS NOTES
Suzette was given Nifedipine XR 30mg and Labetalol 300mg po at 1920hrs.   Bps started to downtrend at 2045. By 2130, readings 135/78, 138/83, 140/85.  Continues to feel well and wishes to go home.   Dr Zapata in the ED send a prescription for Nifedipine XR 30mg po daily, and also will continue her Labetalol 300mg TID.  Parameters given that ideally would like /90 or less.  If has sustained severe range 160/105 or higher, or if has any symptoms of preeclampsia, should return to the ED for reevaluation.  Will RTC for BP check in 2 days.

## 2024-02-15 NOTE — ED PROVIDER NOTES
EMERGENCY DEPARTMENT ENCOUNTER      NAME: Suzette Rivera  AGE: 38 year old female  YOB: 1985  MRN: [id number]  EVALUATION DATE & TIME: 2/14/2024  6:28 PM    PCP: Dora Riley    ED PROVIDER: Roopa Zapata M.D.        Chief Complaint   Patient presents with    Hypertension         FINAL IMPRESSION:    1. Postpartum hypertension        MEDICAL DECISION MAKING:    Suzette Rivera is a 38 year old female with history of preeclampsia who presents to the ER with complaints of elevated blood pressures.     Patient was recently discharged after delivery of her baby.  She had some elevated blood pressures prior to delivery but never truly developed preeclampsia.  Here in ER she has recurrent elevated blood pressures though asymptomatic.  Laboratories reassuring.  Case has been discussed with OB by Isaura Syed PA-C, and plan is d.c home.  Please see PA note for further details.         ED COURSE:  6:28 PM  Went to see patient but she is not yet in the room.    6:41 PM  I met with the patient to gather history and perform my exam. ED course and treatment discussed.  Patient reports that she sees  of [facility].    10:40 PM  Patient blood pressure improved.  OB recommends discharge home.  Patient agrees with this plan.  She remains asymptomatic.    At this time nothing to suggest stroke, PE, ACS, etc.  She is otherwise asymptomatic.  No objective or physical exam findings to suggest preeclampsia or eclampsia.    At the conclusion of the encounter the results of all of the tests and the disposition were discussed. Their questions were answered. The patient (and any family present) acknowledged understanding and were agreeable with the care plan.    CONSULTANTS:  OB - Dr. Monroy        MEDICATIONS GIVEN IN THE EMERGENCY:  Medications   labetalol (NORMODYNE) tablet 300 mg (300 mg Oral $Given 2/14/24 1923)   NIFEdipine ER (ADALAT CC) 24 hr tablet 30 mg (30 mg Oral $Given 2/14/24 1922)  "  labetalol (NORMODYNE/TRANDATE) injection 10 mg (10 mg Intravenous $Given 2/14/24 1924)           NEW PRESCRIPTIONS STARTED AT TODAY'S ER VISIT:     Medication List        Started      NIFEdipine ER 30 MG 24 hr tablet  Commonly known as: ADALAT CC  30 mg, Oral, EVERY MORNING            Modified      labetalol 300 MG tablet  Commonly known as: NORMODYNE  300 mg, Oral, 3 TIMES DAILY  What changed: when to take this              CONDITION:  stable        DISPOSITION:  D.c hoe         =================================================================  =================================================================  TRIAGE ASSESSMENT:  The patient presents to the ED with high blood pressure after delivering a baby on Sunday. The baby was born vaginally, no complications. The patient had hypertension during her pregnancy and is taking labetalol 300 mg x2 daily. Her provider instructed her to increase dosage to 300 mg 3 times daily. The patient had several blood pressures that were high at home, the last being 154/105. Denies headache, blurred vision. Does report bilateral lower extremity swelling as well as face puffiness.       ED Triage Vitals [02/14/24 1748]   Enc Vitals Group      BP (!) 183/95      Pulse 93      Resp 16      Temp 97.9  F (36.6  C)      Temp src Temporal      SpO2 99 %      Weight 96.2 kg (212 lb)      Height 1.727 m (5' 8\")        ================================================================  ================================================================    I am seeing this patient along with AARON Novak, Roopa Zapata MD have reviewed the documentation, personally taken the patient's history, performed an exam and agree with the physical findings, diagnosis and management plan.      HPI    Patient information was obtained from: patient    Use of Intrepreter: N/A     Suzette Rivera is a 38 year old female with history of preeclampsia who presents to the ER with " complaints of elevated blood pressures.    Patient was recently discharged from the hospital after delivering her baby on Sunday, February 11, 2024, she has been taking her labetalol for elevated blood pressure.  Today blood pressures remain quite elevated and she actually took an extra dose around 3:30 PM.  She presents emergency department due to these elevated blood pressures.    She denies any headache, vision changes, fevers, cough, chest pain, shortness of breath, abdominal pain, vomiting or diarrhea.  She does complain of bilateral lower extremity swelling, especially in her feet.    She states that she sees Dr. Barrera with [facility].        REVIEW OF SYSTEMS  Review of Systems   Constitutional:  Negative for fever.   Respiratory:  Negative for cough and shortness of breath.    Cardiovascular:  Positive for leg swelling. Negative for chest pain.   Gastrointestinal:  Negative for abdominal pain, diarrhea, nausea and vomiting.   Skin:  Negative for wound.   Neurological:  Negative for dizziness, weakness, light-headedness, numbness and headaches.   Psychiatric/Behavioral:  Negative for confusion.    All other systems reviewed and are negative.      PAST MEDICAL HISTORY:  Past Medical History:   Diagnosis Date    Hypertension          PAST SURGICAL HISTORY:  Past Surgical History:   Procedure Laterality Date    ADENOIDECTOMY Bilateral 12/28/2018    Procedure: ADENOIDECTOMY;  Surgeon: Kobi Martinez MD;  Location: [facility];  Service: ENT    ETHMOIDECTOMY Bilateral 12/28/2018    Procedure: PARTIAL ETHMOIDECTOMY, BILATERAL ANTROSTOMY WITH CURETTAGE;  Surgeon: Kobi Martinez MD;  Location: [facility];  Service: ENT    HALLUX VALGUS CORRECTION Right     REFRACTIVE SURGERY           CURRENT MEDICATIONS:    Prior to Admission medications    Medication Sig Start Date End Date Taking? Authorizing Provider   acetaminophen (TYLENOL) 325 MG tablet Take 2 tablets (650 mg) by mouth every 4  hours as needed for mild pain or fever (greater than or equal to 38  C /100.4  F (oral) or 38.5  C/ 101.4  F (core).) 2/13/24   Romulo Escamilla CNM   docusate sodium (COLACE) 100 MG capsule Take 1 capsule (100 mg) by mouth 2 times daily 2/13/24   Romulo Escamilla CNM   ibuprofen (ADVIL/MOTRIN) 800 MG tablet Take 1 tablet (800 mg) by mouth every 6 hours as needed for other (cramping) 2/13/24   Romulo Escamilla CNM   labetalol (NORMODYNE) 300 MG tablet Take 1 tablet (300 mg) by mouth 2 times daily 1/25/24   Viki Benitez MD   Prenatal Vit-Fe Fumarate-FA (PRENATAL MULTIVITAMIN  PLUS IRON) 27-1 MG TABS Take by mouth daily    Reported, Patient         ALLERGIES:  Allergies   Allergen Reactions    Adhesive Tape-Silicones [Adhesive Tape] Other (See Comments)     Steri Strips    Amoxicillin-Pot Clavulanate Rash         FAMILY HISTORY:  Family History   Problem Relation Age of Onset    Hypertension Mother     Osteoarthritis Mother     Hypertension Father     Asthma Father     Asthma Brother     Seizure Disorder Maternal Grandmother     Thyroid Disease Maternal Grandmother     Diabetes Paternal Grandfather          SOCIAL HISTORY:  Social History     Socioeconomic History    Marital status:      Spouse name: None    Number of children: None    Years of education: None    Highest education level: None   Tobacco Use    Smoking status: Never    Smokeless tobacco: Never   Substance and Sexual Activity    Alcohol use: Not Currently    Drug use: No    Sexual activity: Yes     Partners: Male     Birth control/protection: Inserts         VITALS:  Patient Vitals for the past 24 hrs:   BP Temp Temp src Pulse Resp SpO2 Height Weight   02/14/24 2200 138/83 -- -- 83 17 95 % -- --   02/14/24 2145 135/78 -- -- 88 17 95 % -- --   02/14/24 2130 135/78 -- -- 93 17 96 % -- --   02/14/24 2115 (!) 149/82 -- -- 88 17 98 % -- --   02/14/24 2100 (!) 155/90 -- -- 88 17 98 % -- --   02/14/24 2045 (!) 146/82 -- -- 86 -- 97 % -- --   02/14/24  "2030 (!) 156/87 -- -- 86 17 98 % -- --   02/14/24 2015 (!) 158/90 -- -- 85 17 98 % -- --   02/14/24 2000 (!) 159/91 -- -- 90 17 100 % -- --   02/14/24 1945 (!) 153/85 -- -- 86 17 99 % -- --   02/14/24 1930 (!) 157/90 -- -- 86 17 99 % -- --   02/14/24 1923 (!) 156/94 -- -- 92 -- 99 % -- --   02/14/24 1915 (!) 156/94 -- -- 85 -- 100 % -- --   02/14/24 1900 (!) 148/88 -- -- 90 17 99 % -- --   02/14/24 1845 (!) 171/97 -- -- 88 17 98 % -- --   02/14/24 1808 (!) 178/103 -- -- 89 -- -- -- --   02/14/24 1748 (!) 183/95 97.9  F (36.6  C) Temporal 93 16 99 % 1.727 m (5' 8\") 96.2 kg (212 lb)       Wt Readings from Last 3 Encounters:   02/14/24 96.2 kg (212 lb)   02/13/24 96.2 kg (212 lb)   01/24/24 95.3 kg (210 lb)       Estimated Creatinine Clearance: 112.5 mL/min (based on SCr of 0.83 mg/dL).      PHYSICAL EXAM    Constitutional:  Well developed, Well nourished, NAD  HENT:  Normocephalic, Atraumatic, Bilateral external ears normal, Nose normal. Neck- Supple, No stridor.   Eyes:  PERRL, EOMI, Conjunctiva normal, No discharge.  Respiratory:  Normal breath sounds, No respiratory distress, No wheezing, Speaks full sentences easily. No cough.   Cardiovascular:  Normal heart rate, Regular rhythm, No murmurs, No rubs, No gallops.   GI:  No excessive obesity.  Bowel sounds normal, Soft, No tenderness, No masses, No flank tenderness. No rebound or guarding.   : deferred  Musculoskeletal: 2+ DP pulses. +BLE edema, No cyanosis, No clubbing. Good range of motion in all major joints. No tenderness to palpation or major deformities noted.   Integument:  Warm, Dry, No erythema, No rash.  No petechiae.   Neurologic:  Alert & oriented x 3, Normal motor function, Normal sensory function, No focal deficits noted. Normal gait.   Psychiatric:  Affect normal, Cooperative            LAB:  All pertinent labs reviewed and interpreted.  Recent Results (from the past 24 hour(s))   CBC (+ platelets, no diff)    Collection Time: 02/14/24  5:59 PM "   Result Value Ref Range    WBC Count 12.1 (H) 4.0 - 11.0 10e3/uL    RBC Count 3.40 (L) 3.80 - 5.20 10e6/uL    Hemoglobin 9.7 (L) 11.7 - 15.7 g/dL    Hematocrit 28.9 (L) 35.0 - 47.0 %    MCV 85 78 - 100 fL    MCH 28.5 26.5 - 33.0 pg    MCHC 33.6 31.5 - 36.5 g/dL    RDW 16.7 (H) 10.0 - 15.0 %    Platelet Count 154 150 - 450 10e3/uL   Basic metabolic panel    Collection Time: 02/14/24  5:59 PM   Result Value Ref Range    Sodium 137 135 - 145 mmol/L    Potassium 4.1 3.4 - 5.3 mmol/L    Chloride 108 (H) 98 - 107 mmol/L    Carbon Dioxide (CO2) 21 (L) 22 - 29 mmol/L    Anion Gap 8 7 - 15 mmol/L    Urea Nitrogen 12.8 6.0 - 20.0 mg/dL    Creatinine 0.83 0.51 - 0.95 mg/dL    GFR Estimate >90 >60 mL/min/1.73m2    Calcium 9.3 8.6 - 10.0 mg/dL    Glucose 91 70 - 99 mg/dL   Hepatic function panel    Collection Time: 02/14/24  5:59 PM   Result Value Ref Range    Protein Total 5.9 (L) 6.4 - 8.3 g/dL    Albumin 3.2 (L) 3.5 - 5.2 g/dL    Bilirubin Total <0.2 <=1.2 mg/dL    Alkaline Phosphatase 98 40 - 150 U/L    AST 59 (H) 0 - 45 U/L    ALT 43 0 - 50 U/L    Bilirubin Direct <0.20 0.00 - 0.30 mg/dL   INR    Collection Time: 02/14/24  5:59 PM   Result Value Ref Range    INR 0.93 0.85 - 1.15   Partial thromboplastin time    Collection Time: 02/14/24  5:59 PM   Result Value Ref Range    aPTT 26 22 - 38 Seconds   UA with Microscopic reflex to Culture    Collection Time: 02/14/24  6:06 PM    Specimen: Urine, Midstream   Result Value Ref Range    Color Urine Colorless Colorless, Straw, Light Yellow, Yellow    Appearance Urine Clear Clear    Glucose Urine Negative Negative mg/dL    Bilirubin Urine Negative Negative    Ketones Urine Negative Negative mg/dL    Specific Gravity Urine 1.011 1.001 - 1.030    Blood Urine >1.0 mg/dL (A) Negative    pH Urine 6.0 5.0 - 7.0    Protein Albumin Urine Negative Negative mg/dL    Urobilinogen Urine <2.0 <2.0 mg/dL    Nitrite Urine Negative Negative    Leukocyte Esterase Urine 250 Laura/uL (A) Negative     Mucus Urine Present (A) None Seen /LPF    RBC Urine 11 (H) <=2 /HPF    WBC Urine 15 (H) <=5 /HPF    Squamous Epithelials Urine <1 <=1 /HPF       Lab Results   Component Value Date    ABORH O POS 02/10/2024           RADIOLOGY:  none      EKG:    none      PROCEDURES:  none    Medical Decision Making  Obtained supplemental history:Supplemental history obtained?: No  Reviewed external records: External records reviewed?: No  Care impacted by chronic illness:Hypertension  Care significantly affected by social determinants of health:Access to Medical Care  Did you consider but not order tests?: Work up considered but not performed and documented in chart, if applicable  Did you interpret images independently?: Independent interpretation of ECG and images noted in documentation, when applicable.  Consultation discussion with other provider:Did you involve another provider (consultant, MH, pharmacy, etc.)?: I discussed the care with another health care provider, see PA note for documentation for details.  Discharge. PA-C prescribed additional prescription strength medication(s) as charted. I considered admission, but ultimately discharged patient after PA discussion with OB/GYN..      I personally saw the patient and performed a substantive portion of the visit including all aspects of the medical decision making. I personally made/approve the management plan and take responsibility for the patient management.    Roopa Zapata M.D. [facility]  Emergency Medicine and Medical Toxicology  Formerly [facility] EMERGENCY ROOM  [address] [address]  [phone]  Dept: [phone]           Roopa Zapata MD  02/14/24 2244

## 2024-02-15 NOTE — DISCHARGE INSTRUCTIONS
Today you were seen in the emergency department for high blood pressure.  I spoke to your OB/GYN providers who recommended the medications we gave you in the emergency department today.  Thankfully, these medications worked in your blood pressure decreased to an appropriate level prior to discharge.    Your OBGYN team recommended that we increase your blood pressure medications. Please do the following:  Labetalol - INCREASE dose to 300 mg THREE times daily.  Nifedipine - START taking 30mg daily.      Systolic pressure (top number) is....  Diastolic (bottom number) is.... Your blood pressure is....   160 or higher  or 105 or higher   VERY HIGH. Check it again in 10 minutes, then contact your provider and head to the ER     140 - 159  or 90 - 105   HIGH. Keep checking blood pressure 2 times a day. If your blood pressure is in this range for 2 readings, contact your provider within 24 hours.     Less than 140 and Less than 90   NORMAL. Your blood pressure looks great!   Keep checking it 2 times a day      Call your provider right away if you have these symptoms: a severe headache, vision changes, shortness of breath, chest pain, or right upper belly pain. Call even if your blood pressure is okay.  Call 9-1-1 if you feel the symptoms are severe and that it is an emergency.

## 2024-07-20 ENCOUNTER — HEALTH MAINTENANCE LETTER (OUTPATIENT)
Age: 39
End: 2024-07-20

## 2025-02-26 NOTE — ANESTHESIA PREPROCEDURE EVALUATION
Anesthesia Pre-Procedure Evaluation    Patient: Suzette Rivera   MRN: [id number] : 1985        Preoperative Diagnosis: * No pre-op diagnosis entered *    Procedure : * No procedures listed *          No past medical history on file.   Past Surgical History:   Procedure Laterality Date     ADENOIDECTOMY Bilateral 2018    Procedure: ADENOIDECTOMY;  Surgeon: Kobi Martinez MD;  Location: [facility];  Service: ENT     ETHMOIDECTOMY Bilateral 2018    Procedure: PARTIAL ETHMOIDECTOMY, BILATERAL ANTROSTOMY WITH CURETTAGE;  Surgeon: Kobi Martinez MD;  Location: [facility];  Service: ENT     HALLUX VALGUS CORRECTION Right      REFRACTIVE SURGERY        Allergies   Allergen Reactions     Adhesive Tape-Silicones [Adhesive Tape] Other (See Comments)     Steri Strips     Augmentin Rash      Social History     Tobacco Use     Smoking status: Never Smoker     Smokeless tobacco: Never Used   Substance Use Topics     Alcohol use: Yes      Wt Readings from Last 1 Encounters:   22 90.7 kg (200 lb)        Anesthesia Evaluation   Pt has had prior anesthetic. Type: General.    No history of anesthetic complications       ROS/MED HX  ENT/Pulmonary:  - neg pulmonary ROS     Neurologic:  - neg neurologic ROS     Cardiovascular:     (+) hypertension-----    METS/Exercise Tolerance:     Hematologic:  - neg hematologic  ROS     Musculoskeletal:  - neg musculoskeletal ROS     GI/Hepatic:  - neg GI/hepatic ROS     Renal/Genitourinary:       Endo:  - neg endo ROS     Psychiatric/Substance Use:  - neg psychiatric ROS     Infectious Disease:  - neg infectious disease ROS     Malignancy:       Other:            Physical Exam    Airway  airway exam normal           Respiratory Devices and Support         Dental  no notable dental history         Cardiovascular   cardiovascular exam normal          Pulmonary   pulmonary exam normal                OUTSIDE LABS:  CBC:   Lab Results   Component Value  Date    WBC 10.6 01/24/2022    WBC 10.3 01/20/2022    HGB 11.8 01/24/2022    HGB 11.5 (L) 01/20/2022    HCT 34.8 (L) 01/24/2022    HCT 33.6 (L) 01/20/2022     (L) 01/24/2022     01/20/2022     BMP:   Lab Results   Component Value Date     10/26/2018     09/29/2007    POTASSIUM 4.5 10/26/2018    POTASSIUM 3.6 09/29/2007    CHLORIDE 106 10/26/2018    CHLORIDE 102 09/29/2007    CO2 24 10/26/2018    CO2 25 09/29/2007    BUN 10 10/26/2018    BUN 14 09/29/2007    CR 0.70 01/24/2022    CR 0.69 01/20/2022    GLC 85 10/26/2018     (H) 09/29/2007     COAGS: No results found for: PTT, INR, FIBR  POC:   Lab Results   Component Value Date    HCG Negative 12/28/2018     HEPATIC:   Lab Results   Component Value Date    ALBUMIN 4.3 09/29/2007    PROTTOTAL 7.5 09/29/2007    ALT 14 01/24/2022    AST 16 01/24/2022    ALKPHOS 81 09/29/2007    BILITOTAL 0.4 09/29/2007     OTHER:   Lab Results   Component Value Date    SUMIT 9.3 10/26/2018    MAG 5.2 (HH) 01/25/2022       Anesthesia Plan    ASA Status:  2      Anesthesia Type: Epidural.              Consents    Anesthesia Plan(s) and associated risks, benefits, and realistic alternatives discussed. Questions answered and patient/representative(s) expressed understanding.    - Discussed:     - Discussed with:  Patient, Spouse         Postoperative Care            Comments:    Other Comments: BP ok , plts nl            VERONICA ESTES MD   No

## 2025-08-09 ENCOUNTER — HEALTH MAINTENANCE LETTER (OUTPATIENT)
Age: 40
End: 2025-08-09